# Patient Record
Sex: FEMALE | Race: WHITE
[De-identification: names, ages, dates, MRNs, and addresses within clinical notes are randomized per-mention and may not be internally consistent; named-entity substitution may affect disease eponyms.]

---

## 2017-08-09 PROBLEM — Z00.00 ENCOUNTER FOR PREVENTIVE HEALTH EXAMINATION: Status: ACTIVE | Noted: 2017-08-09

## 2017-08-14 ENCOUNTER — RECORD ABSTRACTING (OUTPATIENT)
Age: 67
End: 2017-08-14

## 2017-08-14 DIAGNOSIS — Z78.9 OTHER SPECIFIED HEALTH STATUS: ICD-10-CM

## 2017-08-14 DIAGNOSIS — Z80.9 FAMILY HISTORY OF MALIGNANT NEOPLASM, UNSPECIFIED: ICD-10-CM

## 2017-08-14 DIAGNOSIS — R20.0 ANESTHESIA OF SKIN: ICD-10-CM

## 2017-08-14 DIAGNOSIS — H53.8 OTHER VISUAL DISTURBANCES: ICD-10-CM

## 2017-08-14 DIAGNOSIS — Z86.73 PERSONAL HISTORY OF TRANSIENT ISCHEMIC ATTACK (TIA), AND CEREBRAL INFARCTION W/OUT RESIDUAL DEFICITS: ICD-10-CM

## 2017-08-28 ENCOUNTER — APPOINTMENT (OUTPATIENT)
Dept: NEUROLOGY | Facility: CLINIC | Age: 67
End: 2017-08-28

## 2017-09-12 ENCOUNTER — APPOINTMENT (OUTPATIENT)
Dept: NEUROLOGY | Facility: CLINIC | Age: 67
End: 2017-09-12
Payer: COMMERCIAL

## 2017-09-12 VITALS
DIASTOLIC BLOOD PRESSURE: 65 MMHG | WEIGHT: 142 LBS | BODY MASS INDEX: 26.13 KG/M2 | HEIGHT: 62 IN | SYSTOLIC BLOOD PRESSURE: 125 MMHG

## 2017-09-12 PROCEDURE — 99213 OFFICE O/P EST LOW 20 MIN: CPT

## 2018-03-27 ENCOUNTER — APPOINTMENT (OUTPATIENT)
Dept: NEUROLOGY | Facility: CLINIC | Age: 68
End: 2018-03-27

## 2018-05-21 ENCOUNTER — APPOINTMENT (OUTPATIENT)
Dept: NEUROLOGY | Facility: CLINIC | Age: 68
End: 2018-05-21

## 2018-06-12 ENCOUNTER — APPOINTMENT (OUTPATIENT)
Dept: NEUROLOGY | Facility: CLINIC | Age: 68
End: 2018-06-12
Payer: COMMERCIAL

## 2018-06-12 ENCOUNTER — TRANSCRIPTION ENCOUNTER (OUTPATIENT)
Age: 68
End: 2018-06-12

## 2018-06-12 VITALS
DIASTOLIC BLOOD PRESSURE: 82 MMHG | HEART RATE: 85 BPM | BODY MASS INDEX: 25.76 KG/M2 | HEIGHT: 62 IN | OXYGEN SATURATION: 98 % | WEIGHT: 140 LBS | SYSTOLIC BLOOD PRESSURE: 130 MMHG

## 2018-06-12 PROCEDURE — 99213 OFFICE O/P EST LOW 20 MIN: CPT

## 2019-04-09 ENCOUNTER — NON-APPOINTMENT (OUTPATIENT)
Age: 69
End: 2019-04-09

## 2019-04-09 ENCOUNTER — APPOINTMENT (OUTPATIENT)
Dept: CARDIOLOGY | Facility: CLINIC | Age: 69
End: 2019-04-09
Payer: COMMERCIAL

## 2019-04-09 VITALS
BODY MASS INDEX: 26.68 KG/M2 | WEIGHT: 145 LBS | DIASTOLIC BLOOD PRESSURE: 72 MMHG | RESPIRATION RATE: 16 BRPM | HEIGHT: 62 IN | HEART RATE: 92 BPM | SYSTOLIC BLOOD PRESSURE: 130 MMHG

## 2019-04-09 PROCEDURE — 93000 ELECTROCARDIOGRAM COMPLETE: CPT

## 2019-04-09 PROCEDURE — 99214 OFFICE O/P EST MOD 30 MIN: CPT

## 2019-04-09 RX ORDER — EZETIMIBE AND SIMVASTATIN 10; 20 MG/1; MG/1
10-20 TABLET ORAL
Refills: 0 | Status: DISCONTINUED | COMMUNITY
End: 2019-04-09

## 2019-04-09 RX ORDER — PREDNISONE 20 MG/1
20 TABLET ORAL
Refills: 0 | Status: DISCONTINUED | COMMUNITY
End: 2019-04-09

## 2019-04-12 NOTE — ASSESSMENT
[FreeTextEntry1] : 1.  EKG today reveals sinus rhythm at 92 bpm.  Normal intervals.  No evidence of ischemia.  \par 2.  Palpitations:  Patient with intermittent palpitations without other associated symptoms.  She has a history of chronic palpitations in the past.  I have advised her to undergo a 30-day ambulatory event monitor for further evaluation as well as echocardiogram.  She will return to see me in six weeks for reevaluation.  In the meantime, patient advised to avoid caffeinated products. \par 3.  Review of recent bloodwork performed through her PCP’s office demonstrates a total cholesterol of 175, HDL 69, TC/HDL ratio 2.5, LDL 87, triglycerides 96.  Patient advised to continue a low-fat / low-cholesterol diet.    \par

## 2019-04-12 NOTE — REASON FOR VISIT
[FreeTextEntry1] : Mrs. Daly is a pleasant 68-year-old white female with a past medical history significant for hyperlipidemia as well as palpitations who presents for evaluation.

## 2019-04-12 NOTE — HISTORY OF PRESENT ILLNESS
[FreeTextEntry1] : The patient states that approximately a month ago, she experienced a 2-day history of intermittent palpitations.  These were apparently not associated with lightheadedness or syncope.  She denies associated chest pain, shortness of breath, or diaphoresis.  She states she remains physically active and exercises three times a week.

## 2019-04-12 NOTE — PHYSICAL EXAM
[General Appearance - Well Developed] : well developed [General Appearance - Well Nourished] : well nourished [Normal Oral Mucosa] : normal oral mucosa [General Appearance - In No Acute Distress] : no acute distress [Normal Conjunctiva] : the conjunctiva exhibited no abnormalities [Auscultation Breath Sounds / Voice Sounds] : lungs were clear to auscultation bilaterally [Heart Sounds] : normal S1 and S2 [Heart Rate And Rhythm] : heart rate and rhythm were normal [Bowel Sounds] : normal bowel sounds [Skin Color & Pigmentation] : normal skin color and pigmentation [Abnormal Walk] : normal gait [Skin Turgor] : normal skin turgor [Affect] : the affect was normal [Oriented To Time, Place, And Person] : oriented to person, place, and time [Mood] : the mood was normal [FreeTextEntry1] : No edema

## 2019-04-19 ENCOUNTER — APPOINTMENT (OUTPATIENT)
Dept: CARDIOLOGY | Facility: CLINIC | Age: 69
End: 2019-04-19
Payer: COMMERCIAL

## 2019-04-19 PROCEDURE — 93306 TTE W/DOPPLER COMPLETE: CPT

## 2019-06-25 ENCOUNTER — APPOINTMENT (OUTPATIENT)
Dept: NEUROLOGY | Facility: CLINIC | Age: 69
End: 2019-06-25

## 2019-06-27 ENCOUNTER — APPOINTMENT (OUTPATIENT)
Dept: CARDIOLOGY | Facility: CLINIC | Age: 69
End: 2019-06-27
Payer: COMMERCIAL

## 2019-06-27 ENCOUNTER — NON-APPOINTMENT (OUTPATIENT)
Age: 69
End: 2019-06-27

## 2019-06-27 VITALS
WEIGHT: 143 LBS | SYSTOLIC BLOOD PRESSURE: 115 MMHG | BODY MASS INDEX: 26.31 KG/M2 | RESPIRATION RATE: 16 BRPM | HEART RATE: 89 BPM | HEIGHT: 62 IN | DIASTOLIC BLOOD PRESSURE: 70 MMHG

## 2019-06-27 PROCEDURE — 93000 ELECTROCARDIOGRAM COMPLETE: CPT

## 2019-06-27 PROCEDURE — 99213 OFFICE O/P EST LOW 20 MIN: CPT

## 2019-06-27 NOTE — REASON FOR VISIT
[FreeTextEntry1] : Mrs. Daly is a pleasant 68-year-old white female with a past medical history significant for hyperlipidemia as well as palpitations who presents for evaluation.  Also, the patient is here to review the results of her most recent 30 day event monitor and transthoracic echocardiogram.  In addition, she now states she needs cardiac clearance regarding an endoscopy procedure scheduled on July 23rd with Dr. Sheriff at [facility].  The patient reports her palpitations have since completely resolved and she feels overall well and without cardiac complaints.  She denies CP, SOB, EAST, PND, orthopnea, palpitations, presyncope, syncope.

## 2019-06-27 NOTE — PHYSICAL EXAM
[General Appearance - Well Developed] : well developed [Normal Appearance] : normal appearance [General Appearance - Well Nourished] : well nourished [Normal Conjunctiva] : the conjunctiva exhibited no abnormalities [General Appearance - In No Acute Distress] : no acute distress [Normal Jugular Venous A Waves Present] : normal jugular venous A waves present [Normal Oral Mucosa] : normal oral mucosa [No Jugular Venous [place] A Waves] : no jugular venous [place] A waves [Normal Jugular Venous V Waves Present] : normal jugular venous V waves present [Respiration, Rhythm And Depth] : normal respiratory rhythm and effort [Auscultation Breath Sounds / Voice Sounds] : lungs were clear to auscultation bilaterally [] : no respiratory distress [Heart Sounds] : normal S1 and S2 [Murmurs] : no murmurs present [Heart Rate And Rhythm] : heart rate and rhythm were normal [Edema] : no peripheral edema present [Bowel Sounds] : normal bowel sounds [Abnormal Walk] : normal gait [Skin Color & Pigmentation] : normal skin color and pigmentation [Nail Clubbing] : no clubbing of the fingernails [Oriented To Time, Place, And Person] : oriented to person, place, and time [Skin Turgor] : normal skin turgor [Affect] : the affect was normal [Impaired Insight] : insight and judgment were intact

## 2019-06-27 NOTE — ASSESSMENT
[FreeTextEntry1] : EKG 6/27/2019:  The EKG illustrates sinus rhythm, rate of 87, poor R wave progression V1 to V3, left atrial enlargement pattern.  Essentially unchanged.\par \par

## 2019-06-27 NOTE — DISCUSSION/SUMMARY
[FreeTextEntry1] : 1).  Patient reassured she is cardiovascularly stable based upon the most recent Event Monitor and Transthoracic Echocardiogram illustrating no signs of arrhythmias, pauses or blocks as well as no decreased heart function or significant valvulopathy / cardiomyopathy respectively.\par \par 2).  Based upon Mrs. Susana Daly's otherwise stable cardiac pattern, there is no absolute cardiac contraindication for her to undergo the proposed endoscopy procedure.  She may hold the aspirin five to seven days prior to the procedure and restart thereafter if you deem it safe.\par \par 3).  Follow up with our office in 6 months or PRN.\par \par If I can be of additional assistance, please do not hesitate to call.

## 2019-06-27 NOTE — HISTORY OF PRESENT ILLNESS
[FreeTextEntry1] : Patient is tolerating cardiac medications without negative side effects including ASA 81 mg QD and Vytorin 10-10 mg QD.\par \par Transthoracic Echocardiogram (4/19/2019):  Normal global left ventricular function with an EF of 50 to 55%, left and right atria are normal in size and structure, mild MR and MN.  There is no evidence of pericardial effusion.\par \par 30 Day Event Monitor (4/16 to 5/15/2019):  Baseline sinus rhythm with an average heart rate in the 80s (max 114 bpm, min 78 bpm).  There were NO events or symptoms recorded.  No signs of tachy /  iqra arrhythmias, pauses or AV blocks.

## 2019-10-16 ENCOUNTER — APPOINTMENT (OUTPATIENT)
Dept: CARDIOLOGY | Facility: CLINIC | Age: 69
End: 2019-10-16
Payer: COMMERCIAL

## 2019-10-16 VITALS
HEART RATE: 94 BPM | DIASTOLIC BLOOD PRESSURE: 78 MMHG | RESPIRATION RATE: 16 BRPM | BODY MASS INDEX: 26.87 KG/M2 | SYSTOLIC BLOOD PRESSURE: 126 MMHG | WEIGHT: 146 LBS | HEIGHT: 62 IN

## 2019-10-16 PROCEDURE — 93306 TTE W/DOPPLER COMPLETE: CPT

## 2019-10-16 PROCEDURE — 93000 ELECTROCARDIOGRAM COMPLETE: CPT

## 2019-10-16 PROCEDURE — 99214 OFFICE O/P EST MOD 30 MIN: CPT

## 2019-10-16 NOTE — REASON FOR VISIT
[FreeTextEntry1] : The patient is a 68 y.o. white female with a past medical history significant for hyperlipidemia and palpitations who presents today with a new left bundle branch block and cardiology clearance for an upcoming colonoscopy.

## 2019-10-16 NOTE — DISCUSSION/SUMMARY
[FreeTextEntry1] : Case and plan discussed with Dr. Ramirez.\par \par 1 - New LBBB:  patient went to PCP yesterday for medical clearance for an upcoming routine colonoscopy and was found to have a new LBBB.  Patient states she has been experiencing some mild exertional chest discomfort and lightheadedness for the past several weeks.  Denies shortness of breath, palpitations, or syncope.  \par \par Patient had echocardiogram 4/19/19 which revealed normal global left ventricular function and an EF of 50-55%.  Left and right atria were normal in size and structure.  No significant valvular abnormalities.\par 30-day event monitor from 4/16/19 - 5/15/19:  showed sinus rhythm with an average heart rate in the 80's (max 114, min 78).  There was no atrial fibrillation, VT, SVT, pauses or AV blocks.\par \par Will have Mrs. Daly undergo echocardiogram and 1-day pharmacologic nuclear stress test to assess for any underlying CAD.\par \par 2 - Hyperlipidemia:  Continue with Vytorin 10/20mg daily.  Follow low fat, low cholesterol, low carbohydrate diet.\par \par 3 - Await results of echocardiogram and nuclear stress test to assess for cardiac clearance.\par Patient has scheduled follow up visit with Dr. Nagy in December.

## 2019-10-16 NOTE — PHYSICAL EXAM
[General Appearance - Well Developed] : well developed [General Appearance - In No Acute Distress] : no acute distress [Normal Conjunctiva] : the conjunctiva exhibited no abnormalities [Normal Oral Mucosa] : normal oral mucosa [Auscultation Breath Sounds / Voice Sounds] : lungs were clear to auscultation bilaterally [Heart Rate And Rhythm] : heart rate and rhythm were normal [Heart Sounds] : normal S1 and S2 [Murmurs] : no murmurs present [Bowel Sounds] : normal bowel sounds [Abnormal Walk] : normal gait [FreeTextEntry1] : Trace bilateral LE edema [Skin Color & Pigmentation] : normal skin color and pigmentation [Skin Turgor] : normal skin turgor [Oriented To Time, Place, And Person] : oriented to person, place, and time [Affect] : the affect was normal [Mood] : the mood was normal

## 2019-10-16 NOTE — HISTORY OF PRESENT ILLNESS
[FreeTextEntry1] : Mrs. Daly presents today for cardiac clearance for a scheduled routine colonoscopy next week.  States she went to her PCP, Dr. Peñaloza yesterday for medical clearance and was found to have a new left bundle branch block.  States that for the past several weeks,  she has been experiencing some mild chest heaviness with exertion as well as mild lightheadedness.  Does not last very long.  Denies associated shortness of breath, palpitations or syncope.  Tries to be as compliant as she can be with her low sodium intake.

## 2019-10-17 ENCOUNTER — APPOINTMENT (OUTPATIENT)
Dept: CARDIOLOGY | Facility: CLINIC | Age: 69
End: 2019-10-17
Payer: COMMERCIAL

## 2019-10-17 PROCEDURE — A9500: CPT

## 2019-10-17 PROCEDURE — 93015 CV STRESS TEST SUPVJ I&R: CPT

## 2019-10-17 PROCEDURE — 78452 HT MUSCLE IMAGE SPECT MULT: CPT

## 2019-10-17 RX ADMIN — AMINOPHYLLINE 3 MG/ML: 25 INJECTION, SOLUTION INTRAVENOUS at 00:00

## 2019-10-17 RX ADMIN — REGADENOSON 5 MG/5ML: 0.08 INJECTION, SOLUTION INTRAVENOUS at 00:00

## 2019-10-29 RX ORDER — AMINOPHYLLINE 25 MG/ML
25 INJECTION, SOLUTION INTRAVENOUS
Qty: 0 | Refills: 0 | Status: COMPLETED | OUTPATIENT
Start: 2019-10-17

## 2019-10-29 RX ORDER — REGADENOSON 0.08 MG/ML
0.4 INJECTION, SOLUTION INTRAVENOUS
Qty: 4 | Refills: 0 | Status: COMPLETED | OUTPATIENT
Start: 2019-10-17

## 2019-11-19 RX ORDER — KIT FOR THE PREPARATION OF TECHNETIUM TC99M SESTAMIBI 1 MG/5ML
INJECTION, POWDER, LYOPHILIZED, FOR SOLUTION PARENTERAL
Refills: 0 | Status: COMPLETED | OUTPATIENT
Start: 2019-11-19

## 2019-11-19 RX ADMIN — KIT FOR THE PREPARATION OF TECHNETIUM TC99M SESTAMIBI 0: 1 INJECTION, POWDER, LYOPHILIZED, FOR SOLUTION PARENTERAL at 00:00

## 2019-12-19 ENCOUNTER — APPOINTMENT (OUTPATIENT)
Dept: CARDIOLOGY | Facility: CLINIC | Age: 69
End: 2019-12-19
Payer: COMMERCIAL

## 2019-12-19 VITALS
DIASTOLIC BLOOD PRESSURE: 70 MMHG | RESPIRATION RATE: 16 BRPM | SYSTOLIC BLOOD PRESSURE: 116 MMHG | HEIGHT: 62 IN | WEIGHT: 147 LBS | BODY MASS INDEX: 27.05 KG/M2 | HEART RATE: 84 BPM

## 2019-12-19 PROCEDURE — 99214 OFFICE O/P EST MOD 30 MIN: CPT

## 2019-12-19 PROCEDURE — 93000 ELECTROCARDIOGRAM COMPLETE: CPT

## 2019-12-24 NOTE — ASSESSMENT
[FreeTextEntry1] : 1.  EKG today reveals sinus rhythm at 84 bpm.  LVH voltage is noted.  Normal intervals.  Inverted T-waves in leads V1 through V3 which are chronic changes for this particular patient.  2.  Hyperlipidemia:  Low-fat / low-cholesterol diet advised.  3.  Left bundle branch block:  Patient with narrow complex QRS on today’s EKG.  Does appear to have intermittent LBBB.  Recent nuclear stress test utilizing IV Regadenoson failed to reveal evidence of pharmacologically induced reversible ischemia.  Patient reassured.  At this time, she has no complaints of lightheadedness or syncope.  Will continue to monitor closely.  Her echocardiogram revealed normal left ventricular chamber dimensions and wall motion without evidence of segmental wall motion abnormalities.  Left ventricular ejection fraction 50-55%.  Will continue to follow closely clinically.  If stable, office visit six months.

## 2019-12-24 NOTE — REASON FOR VISIT
[FreeTextEntry1] : Mrs. Daly is a pleasant 69-year-old white female with a past medical history significant for hyperlipidemia, palpitations, and intermittent left bundle branch block, who presents for follow up evaluation. \par \par \par

## 2019-12-24 NOTE — PHYSICAL EXAM
[General Appearance - Well Nourished] : well nourished [General Appearance - Well Developed] : well developed [Normal Conjunctiva] : the conjunctiva exhibited no abnormalities [General Appearance - In No Acute Distress] : no acute distress [Normal Oral Mucosa] : normal oral mucosa [Auscultation Breath Sounds / Voice Sounds] : lungs were clear to auscultation bilaterally [Heart Rate And Rhythm] : heart rate and rhythm were normal [Abnormal Walk] : normal gait [Bowel Sounds] : normal bowel sounds [Heart Sounds] : normal S1 and S2 [Skin Color & Pigmentation] : normal skin color and pigmentation [Skin Turgor] : normal skin turgor [Mood] : the mood was normal [Oriented To Time, Place, And Person] : oriented to person, place, and time [Affect] : the affect was normal [FreeTextEntry1] : No edema

## 2019-12-24 NOTE — HISTORY OF PRESENT ILLNESS
[FreeTextEntry1] : From a cardiac standpoint, Mrs. Daly denies exertional chest pain, shortness of breath, palpitations, lightheadedness, or syncope.

## 2020-01-17 ENCOUNTER — APPOINTMENT (OUTPATIENT)
Dept: CARDIOLOGY | Facility: CLINIC | Age: 70
End: 2020-01-17
Payer: COMMERCIAL

## 2020-01-17 PROCEDURE — 93880 EXTRACRANIAL BILAT STUDY: CPT

## 2020-02-28 ENCOUNTER — APPOINTMENT (OUTPATIENT)
Dept: CARDIOLOGY | Facility: CLINIC | Age: 70
End: 2020-02-28

## 2020-06-03 ENCOUNTER — APPOINTMENT (OUTPATIENT)
Dept: CARDIOLOGY | Facility: CLINIC | Age: 70
End: 2020-06-03
Payer: COMMERCIAL

## 2020-06-03 VITALS
HEIGHT: 62 IN | BODY MASS INDEX: 26.68 KG/M2 | HEART RATE: 95 BPM | DIASTOLIC BLOOD PRESSURE: 76 MMHG | SYSTOLIC BLOOD PRESSURE: 144 MMHG | WEIGHT: 145 LBS

## 2020-06-03 DIAGNOSIS — Z86.39 PERSONAL HISTORY OF OTHER ENDOCRINE, NUTRITIONAL AND METABOLIC DISEASE: ICD-10-CM

## 2020-06-03 PROCEDURE — 99213 OFFICE O/P EST LOW 20 MIN: CPT | Mod: 95

## 2020-06-03 NOTE — PHYSICAL EXAM
[General Appearance - Well Developed] : well developed [General Appearance - Well Nourished] : well nourished [] : no respiratory distress [General Appearance - In No Acute Distress] : no acute distress [Normal Conjunctiva] : the conjunctiva exhibited no abnormalities [Skin Color & Pigmentation] : normal skin color and pigmentation [Oriented To Time, Place, And Person] : oriented to person, place, and time [Affect] : the affect was normal [Mood] : the mood was normal

## 2020-06-04 NOTE — HISTORY OF PRESENT ILLNESS
[Home] : at home, [unfilled] , at the time of the visit. [Medical Office: ([facility])___] : at the medical office located in  [Spouse] : spouse [FreeTextEntry1] : Patient requested a TELEHEALTH contact at this time to discuss specific cardiovascular issues and associated risk factors. This contact took place via TELEHEALTH link utilizing [employer] software. Consent was obtained from the patient in advance of this communication. The consent form can be found in the "OTHER CONSENTS" section of the patient's medical record. Time spent on this communication was approximately: 15 minutes\par \par From a cardiac standpoint, Mrs. Daly denies exertional chest pain, shortness of breath, palpitations, lightheadedness, or syncope.  She remains reasonably active given the current COVID-19 restrictions.

## 2020-06-04 NOTE — ASSESSMENT
[FreeTextEntry1] :   Hyperlipidemia:  Patient attempting to follow a low-fat / low-cholesterol diet.  Has not undergoing fasting bloodwork.  Will do so prior to her next office visit which will likely be in 2-3 months.   Palpitations:  Patient without significant palpitations at this time.  Has been advised to avoid caffeinated products and walk as much as possible.   Intermittent left bundle branch block:  Unable to assess cardiac conduction system at this time due to the inability to obtain EKG.  Patient without symptoms of lightheadedness or syncope.    Peripheral artery disease:  Patient status-post carotid duplex five months ago which revealed mild bilateral plaquing.  Currently on Vytorin and low-dose aspirin.  Will undergo fasting bloodwork prior to her next office visit.

## 2020-09-18 ENCOUNTER — APPOINTMENT (OUTPATIENT)
Dept: CARDIOLOGY | Facility: CLINIC | Age: 70
End: 2020-09-18
Payer: COMMERCIAL

## 2020-09-18 VITALS
WEIGHT: 148 LBS | RESPIRATION RATE: 16 BRPM | SYSTOLIC BLOOD PRESSURE: 112 MMHG | HEIGHT: 62 IN | BODY MASS INDEX: 27.23 KG/M2 | HEART RATE: 87 BPM | DIASTOLIC BLOOD PRESSURE: 70 MMHG | TEMPERATURE: 97.2 F

## 2020-09-18 PROCEDURE — 93000 ELECTROCARDIOGRAM COMPLETE: CPT

## 2020-09-18 PROCEDURE — 99214 OFFICE O/P EST MOD 30 MIN: CPT

## 2020-09-21 NOTE — REASON FOR VISIT
[FreeTextEntry1] : Mrs. Daly is a pleasant 69-year-old white female with a past medical history significant for hyperlipidemia, palpitations, and intermittent left bundle branch block on EKG, who presents for follow up evaluation.  \par  \par

## 2020-09-21 NOTE — HISTORY OF PRESENT ILLNESS
[FreeTextEntry1] :  From a cardiac standpoint, Mrs. Daly feels well denying exertional chest pain, shortness of breath, palpitations, lightheadedness, or syncope.  She is currently observing COVID-19 pandemic restrictions.

## 2020-09-21 NOTE — ASSESSMENT
[FreeTextEntry1] : \par 1.  EKG today reveals normal sinus rhythm at 87 bpm.  Left bundle branch block.  \par \par 2.  Hyperlipidemia:  Review of recent lipid profile demonstrates a total cholesterol of 187, HDL 80, TC/HDL ratio 2.3, LDL 87, triglycerides 103.  Patient advised to continue a low-fat / low-cholesterol diet as well as current Vytorin therapy.  \par \par 3.  Palpitations:  Patient without significant palpitations in recent months.  \par \par 4.  Peripheral artery disease:  Patient with carotid duplex earlier this year which demonstrated mild bilateral plaquing.  Patient advised to continue with statin therapy as well as low-dose aspirin.  She is also advised to exercise on a regular basis and if clinically stable to return here in approximately six months for a follow up evaluation.   \par

## 2021-03-09 ENCOUNTER — APPOINTMENT (OUTPATIENT)
Dept: CARDIOLOGY | Facility: CLINIC | Age: 71
End: 2021-03-09
Payer: COMMERCIAL

## 2021-03-09 VITALS
TEMPERATURE: 97.5 F | RESPIRATION RATE: 16 BRPM | WEIGHT: 148 LBS | DIASTOLIC BLOOD PRESSURE: 66 MMHG | HEIGHT: 62 IN | SYSTOLIC BLOOD PRESSURE: 120 MMHG | HEART RATE: 92 BPM | BODY MASS INDEX: 27.23 KG/M2

## 2021-03-09 DIAGNOSIS — R51.9 HEADACHE, UNSPECIFIED: ICD-10-CM

## 2021-03-09 DIAGNOSIS — M62.838 OTHER MUSCLE SPASM: ICD-10-CM

## 2021-03-09 PROCEDURE — 99214 OFFICE O/P EST MOD 30 MIN: CPT

## 2021-03-09 PROCEDURE — 93000 ELECTROCARDIOGRAM COMPLETE: CPT

## 2021-03-09 PROCEDURE — 99072 ADDL SUPL MATRL&STAF TM PHE: CPT

## 2021-03-09 RX ORDER — PANTOPRAZOLE 40 MG/1
40 TABLET, DELAYED RELEASE ORAL DAILY
Refills: 0 | Status: ACTIVE | COMMUNITY

## 2021-03-09 RX ORDER — EZETIMIBE AND SIMVASTATIN 10; 20 MG/1; MG/1
10-20 TABLET ORAL
Refills: 0 | Status: ACTIVE | COMMUNITY

## 2021-03-09 RX ORDER — ASPIRIN ENTERIC COATED TABLETS 81 MG 81 MG/1
81 TABLET, DELAYED RELEASE ORAL
Refills: 0 | Status: ACTIVE | COMMUNITY

## 2021-03-09 RX ORDER — CRANBERRY FRUIT EXTRACT 200 MG
CAPSULE ORAL
Refills: 0 | Status: ACTIVE | COMMUNITY

## 2021-03-09 RX ORDER — NITROFURANTOIN (MONOHYDRATE/MACROCRYSTALS) 25; 75 MG/1; MG/1
100 CAPSULE ORAL DAILY
Refills: 0 | Status: ACTIVE | COMMUNITY

## 2021-03-09 RX ORDER — MULTIVITAMIN
TABLET ORAL
Refills: 0 | Status: ACTIVE | COMMUNITY

## 2021-03-09 RX ORDER — MULTIVIT-MIN/IRON/FOLIC ACID/K 18-600-40
CAPSULE ORAL
Refills: 0 | Status: ACTIVE | COMMUNITY

## 2021-03-09 RX ORDER — BACILLUS COAGULANS/INULIN 1B-250 MG
CAPSULE ORAL
Refills: 0 | Status: ACTIVE | COMMUNITY

## 2021-03-11 NOTE — PHYSICAL EXAM
[General Appearance - Well Developed] : well developed [General Appearance - Well Nourished] : well nourished [General Appearance - In No Acute Distress] : no acute distress [Normal Conjunctiva] : the conjunctiva exhibited no abnormalities [Normal Oral Mucosa] : normal oral mucosa [] : no respiratory distress [Heart Rate And Rhythm] : heart rate and rhythm were normal [Heart Sounds] : normal S1 and S2 [Bowel Sounds] : normal bowel sounds [Abnormal Walk] : normal gait [Skin Color & Pigmentation] : normal skin color and pigmentation [Oriented To Time, Place, And Person] : oriented to person, place, and time [Affect] : the affect was normal [Mood] : the mood was normal [FreeTextEntry1] : No edema

## 2021-03-11 NOTE — ASSESSMENT
[FreeTextEntry1] : 1.  EKG today reveals sinus rhythm at 92 bpm.  Left bundle branch block. \par \par 2.  Palpitations:  Patient without significant palpitations at this time.\par \par 3.  Hyperlipidemia:  No recent bloodwork performed.  Patient advised to follow up with her PCP and undergo fasting lipid profile. \par \par 4.  Neck pain associated with left shoulder and arm pain:  Patient with a history of cervical disc disease.  Suspect bulging disc with nerve impingement as the cause.  I have advised the patient to follow up with her PCP regarding MRI assessment of her cervical spine as well as antianxiety medication in order to better deal with her emotional ups and downs.  If stable from a cardiac standpoint, office visit six months.  \par

## 2021-03-11 NOTE — REASON FOR VISIT
[FreeTextEntry1] : Mrs. Daly is a pleasant 65-year-old white female with a past medical history significant for hyperlipidemia, palpitations, intermittent left bundle branch block on EKG, and cervical disc disease, who presents for follow up evaluation.  \par

## 2021-03-11 NOTE — HISTORY OF PRESENT ILLNESS
[FreeTextEntry1] : From a cardiac standpoint, Mrs. Daly denies exertional chest pain, shortness of breath, or palpitations.  She experiences intermittent neck pain with radiation to left shoulder and down her left arm.  These episodes appear to occur in conjunction with increased emotional stress.

## 2021-05-19 ENCOUNTER — APPOINTMENT (OUTPATIENT)
Dept: CARDIOLOGY | Facility: CLINIC | Age: 71
End: 2021-05-19
Payer: COMMERCIAL

## 2021-05-19 VITALS
HEART RATE: 87 BPM | SYSTOLIC BLOOD PRESSURE: 114 MMHG | DIASTOLIC BLOOD PRESSURE: 64 MMHG | TEMPERATURE: 97.9 F | HEIGHT: 62 IN | BODY MASS INDEX: 26.68 KG/M2 | RESPIRATION RATE: 16 BRPM | WEIGHT: 145 LBS

## 2021-05-19 PROCEDURE — 93000 ELECTROCARDIOGRAM COMPLETE: CPT

## 2021-05-19 PROCEDURE — 99214 OFFICE O/P EST MOD 30 MIN: CPT

## 2021-05-19 PROCEDURE — 99072 ADDL SUPL MATRL&STAF TM PHE: CPT

## 2021-05-21 NOTE — REASON FOR VISIT
[FreeTextEntry1] : Mrs. Daly is a pleasant 70-year-old white female with a past medical history significant for hyperlipidemia, palpitations, intermittent left bundle branch block, and cervical disease who presents for follow up evaluation.  \par \par

## 2021-05-21 NOTE — ASSESSMENT
[FreeTextEntry1] : 1.  EKG today reveals sinus rhythm at 87 bpm.  Left bundle branch block.  \par \par 2.  “Labile hypertension:” Patient with normal blood pressure here today as well as most other times she has visited here.  I do not believe that she has labile hypertension but rather occasional blood pressure spikes secondary to back pain.  I have advised her on a strict low-salt diet, good hydration, and exercise.  Would not continue recently prescribed Valsartan 40 mg q.d. \par \par 3.  Hyperlipidemia:  Patient without recent bloodwork.  Patient states that she will follow up with her PCP who will do bloodwork in July.  I will see the patient at the end of August for follow up.  A low-fat / low-cholesterol / low-salt diet discussed.  \par

## 2021-05-21 NOTE — HISTORY OF PRESENT ILLNESS
[FreeTextEntry1] : From a cardiac standpoint, Mrs. Daly denies exertional chest pain, shortness of breath, palpitations, lightheadedness, or syncope.  She does continue to experience intermittent neck and back pain and states that from time to time her blood pressure is “elevated.”

## 2021-08-09 NOTE — PHYSICAL EXAM
[General Appearance - Well Developed] : well developed [General Appearance - Well Nourished] : well nourished [General Appearance - In No Acute Distress] : no acute distress [Normal Conjunctiva] : the conjunctiva exhibited no abnormalities [Normal Oral Mucosa] : normal oral mucosa [] : no respiratory distress [Heart Rate And Rhythm] : heart rate and rhythm were normal [Heart Sounds] : normal S1 and S2 [Bowel Sounds] : normal bowel sounds [Abnormal Walk] : normal gait [Skin Color & Pigmentation] : normal skin color and pigmentation [Oriented To Time, Place, And Person] : oriented to person, place, and time Statement Selected [Affect] : the affect was normal [Mood] : the mood was normal [FreeTextEntry1] : No edema

## 2021-08-30 ENCOUNTER — RESULT CHARGE (OUTPATIENT)
Age: 71
End: 2021-08-30

## 2021-08-31 ENCOUNTER — APPOINTMENT (OUTPATIENT)
Dept: CARDIOLOGY | Facility: CLINIC | Age: 71
End: 2021-08-31
Payer: COMMERCIAL

## 2021-08-31 VITALS
HEART RATE: 79 BPM | BODY MASS INDEX: 26.87 KG/M2 | HEIGHT: 62 IN | WEIGHT: 146 LBS | SYSTOLIC BLOOD PRESSURE: 120 MMHG | DIASTOLIC BLOOD PRESSURE: 60 MMHG | RESPIRATION RATE: 16 BRPM

## 2021-08-31 PROCEDURE — 99214 OFFICE O/P EST MOD 30 MIN: CPT

## 2021-08-31 PROCEDURE — 93000 ELECTROCARDIOGRAM COMPLETE: CPT

## 2021-09-02 NOTE — ASSESSMENT
[FreeTextEntry1] : 1.  EKG today reveals normal sinus rhythm at 79 bpm.  Normal intervals.  LVH voltage.  Chronic T-wave inversions are noted in precordial leads V1 through V5.  \par \par 2.  Hyperlipidemia:  Review of recent bloodwork demonstrates a total cholesterol of 173, HDL 72, TC/HDL ratio 2.4, LDL 82, triglycerides 97.  Patient is advised to continue a low-fat / low-cholesterol diet as well as her Vytorin therapy.  \par \par 3.  Palpitations:  These appear to be very sporadic but rare at this point.  Patient to notify me if these worsen in any way.  \par \par 4.  Intermittent left bundle branch block:  Patient in sinus rhythm without left bundle branch block today.  Noted to have chronic precordial T-wave changes.  Patient advised to exercise on a regular basis, and if clinically stable, will undergo a carotid duplex to assess her peripheral artery disease prior to her follow up in six months.   \par

## 2021-09-02 NOTE — REASON FOR VISIT
[FreeTextEntry1] : Mrs. Daly is a pleasant 70-year-old white female with a past medical history significant for hyperlipidemia, palpitations, intermittent left bundle branch block, and cervical disc disease, who presents for follow up evaluation.  \par

## 2021-09-17 ENCOUNTER — EMERGENCY (EMERGENCY)
Facility: HOSPITAL | Age: 71
LOS: 0 days | Discharge: HOME | End: 2021-09-17
Attending: STUDENT IN AN ORGANIZED HEALTH CARE EDUCATION/TRAINING PROGRAM
Payer: COMMERCIAL

## 2021-09-17 VITALS
RESPIRATION RATE: 16 BRPM | SYSTOLIC BLOOD PRESSURE: 153 MMHG | OXYGEN SATURATION: 99 % | DIASTOLIC BLOOD PRESSURE: 78 MMHG | HEART RATE: 97 BPM | TEMPERATURE: 99 F

## 2021-09-17 VITALS — HEIGHT: 64 IN | WEIGHT: 145.06 LBS

## 2021-09-17 DIAGNOSIS — R11.0 NAUSEA: ICD-10-CM

## 2021-09-17 DIAGNOSIS — G43.809 OTHER MIGRAINE, NOT INTRACTABLE, WITHOUT STATUS MIGRAINOSUS: ICD-10-CM

## 2021-09-17 DIAGNOSIS — R42 DIZZINESS AND GIDDINESS: ICD-10-CM

## 2021-09-17 DIAGNOSIS — H81.10 BENIGN PAROXYSMAL VERTIGO, UNSPECIFIED EAR: ICD-10-CM

## 2021-09-17 DIAGNOSIS — R51.9 HEADACHE, UNSPECIFIED: ICD-10-CM

## 2021-09-17 LAB
ALBUMIN SERPL ELPH-MCNC: 4.2 G/DL — SIGNIFICANT CHANGE UP (ref 3.3–5)
ALP SERPL-CCNC: 79 U/L — SIGNIFICANT CHANGE UP (ref 40–120)
ALT FLD-CCNC: 32 U/L — SIGNIFICANT CHANGE UP (ref 12–78)
ANION GAP SERPL CALC-SCNC: 5 MMOL/L — SIGNIFICANT CHANGE UP (ref 5–17)
AST SERPL-CCNC: 21 U/L — SIGNIFICANT CHANGE UP (ref 15–37)
BILIRUB SERPL-MCNC: 0.4 MG/DL — SIGNIFICANT CHANGE UP (ref 0.2–1.2)
BUN SERPL-MCNC: 19 MG/DL — SIGNIFICANT CHANGE UP (ref 7–23)
CALCIUM SERPL-MCNC: 9 MG/DL — SIGNIFICANT CHANGE UP (ref 8.5–10.1)
CHLORIDE SERPL-SCNC: 105 MMOL/L — SIGNIFICANT CHANGE UP (ref 96–108)
CO2 SERPL-SCNC: 27 MMOL/L — SIGNIFICANT CHANGE UP (ref 22–31)
CREAT SERPL-MCNC: 0.7 MG/DL — SIGNIFICANT CHANGE UP (ref 0.5–1.3)
GLUCOSE SERPL-MCNC: 109 MG/DL — HIGH (ref 70–99)
HCT VFR BLD CALC: 40.9 % — SIGNIFICANT CHANGE UP (ref 34.5–45)
HGB BLD-MCNC: 13.4 G/DL — SIGNIFICANT CHANGE UP (ref 11.5–15.5)
MCHC RBC-ENTMCNC: 30.3 PG — SIGNIFICANT CHANGE UP (ref 27–34)
MCHC RBC-ENTMCNC: 32.8 GM/DL — SIGNIFICANT CHANGE UP (ref 32–36)
MCV RBC AUTO: 92.5 FL — SIGNIFICANT CHANGE UP (ref 80–100)
PLATELET # BLD AUTO: 334 K/UL — SIGNIFICANT CHANGE UP (ref 150–400)
POTASSIUM SERPL-MCNC: 3.8 MMOL/L — SIGNIFICANT CHANGE UP (ref 3.5–5.3)
POTASSIUM SERPL-SCNC: 3.8 MMOL/L — SIGNIFICANT CHANGE UP (ref 3.5–5.3)
PROT SERPL-MCNC: 7.9 GM/DL — SIGNIFICANT CHANGE UP (ref 6–8.3)
RBC # BLD: 4.42 M/UL — SIGNIFICANT CHANGE UP (ref 3.8–5.2)
RBC # FLD: 12.8 % — SIGNIFICANT CHANGE UP (ref 10.3–14.5)
SODIUM SERPL-SCNC: 137 MMOL/L — SIGNIFICANT CHANGE UP (ref 135–145)
WBC # BLD: 8.46 K/UL — SIGNIFICANT CHANGE UP (ref 3.8–10.5)
WBC # FLD AUTO: 8.46 K/UL — SIGNIFICANT CHANGE UP (ref 3.8–10.5)

## 2021-09-17 PROCEDURE — 99284 EMERGENCY DEPT VISIT MOD MDM: CPT

## 2021-09-17 PROCEDURE — 96374 THER/PROPH/DIAG INJ IV PUSH: CPT

## 2021-09-17 PROCEDURE — 80053 COMPREHEN METABOLIC PANEL: CPT

## 2021-09-17 PROCEDURE — 96375 TX/PRO/DX INJ NEW DRUG ADDON: CPT

## 2021-09-17 PROCEDURE — 85027 COMPLETE CBC AUTOMATED: CPT

## 2021-09-17 PROCEDURE — 99284 EMERGENCY DEPT VISIT MOD MDM: CPT | Mod: 25

## 2021-09-17 PROCEDURE — 36415 COLL VENOUS BLD VENIPUNCTURE: CPT

## 2021-09-17 RX ORDER — MECLIZINE HCL 12.5 MG
1 TABLET ORAL
Qty: 20 | Refills: 0
Start: 2021-09-17

## 2021-09-17 RX ORDER — KETOROLAC TROMETHAMINE 30 MG/ML
15 SYRINGE (ML) INJECTION ONCE
Refills: 0 | Status: DISCONTINUED | OUTPATIENT
Start: 2021-09-17 | End: 2021-09-17

## 2021-09-17 RX ORDER — SODIUM CHLORIDE 9 MG/ML
1000 INJECTION INTRAMUSCULAR; INTRAVENOUS; SUBCUTANEOUS ONCE
Refills: 0 | Status: COMPLETED | OUTPATIENT
Start: 2021-09-17 | End: 2021-09-17

## 2021-09-17 RX ORDER — ONDANSETRON 8 MG/1
1 TABLET, FILM COATED ORAL
Qty: 20 | Refills: 0
Start: 2021-09-17

## 2021-09-17 RX ORDER — METOCLOPRAMIDE HCL 10 MG
10 TABLET ORAL ONCE
Refills: 0 | Status: COMPLETED | OUTPATIENT
Start: 2021-09-17 | End: 2021-09-17

## 2021-09-17 RX ADMIN — Medication 10 MILLIGRAM(S): at 20:27

## 2021-09-17 RX ADMIN — SODIUM CHLORIDE 1000 MILLILITER(S): 9 INJECTION INTRAMUSCULAR; INTRAVENOUS; SUBCUTANEOUS at 20:27

## 2021-09-17 RX ADMIN — Medication 15 MILLIGRAM(S): at 20:27

## 2021-09-17 NOTE — ED STATDOCS - CLINICAL SUMMARY MEDICAL DECISION MAKING FREE TEXT BOX
Known Hx of vestibular migraines with vertigo. symptoms similar to prior. Dizziness improved. Still headache some headache. Feels mildly dehydrated. symptoms control and discharge. No cone for new acute pathology. Known Hx of vestibular migraines with vertigo. symptoms similar to prior. Dizziness improved. Still headache some headache. Feels mildly dehydrated. symptoms control and discharge. No cone for new acute pathology.    KAVEH Jarvis, Attending: reviewed note.    Resolved peripheral vertigo from known vestibular migraines. Plan to treat lingering headache and hydrate. No concern for acute CNS or cardiac issue. Has specialist f/u.

## 2021-09-17 NOTE — ED STATDOCS - OBJECTIVE STATEMENT
71 y/o female presents to the ED c/o headache, dizziness with associated nausea. Pt states she has a diagnosis of vestibular migraines and prescribed with Meclizine and Zofran.  States she had room room-spinning dizziness today. States she was seen at [facility] and came to ED for further evaluation. No longer has the room-spinning feeling now, but is asking for IVF, because she was unable to eat/drink much today, secondary to the dizziness and nausea. Has a neurologist to follow-up with: Dr. Rocky Rodriguez. 71 y/o female presents to the ED c/o headache, dizziness with associated nausea. Pt states she has a diagnosis of vestibular migraines and prescribed with Meclizine and Zofran.  States she had room room-spinning dizziness today. States she was seen at [facility] and came to ED for further evaluation. No longer has the room-spinning feeling now, but is asking for IVF, because she was unable to eat/drink much today, secondary to the dizziness and nausea. Has a neurologist to follow-up with: Dr. Rocky Rodriguez.    KAVEH Jarvis, Attending: chest pain, dyspnea, or fever/chills. No falls or trauma. Feeling improved. Planning to see neurologist.

## 2021-09-17 NOTE — ED STATDOCS - ATTENDING CONTRIBUTION TO CARE
I, Ge Jarvis, ED Attending,  performed the initial face to face bedside interview with this patient regarding history of present illness, review of symptoms and relevant past medical, social and family history.  I completed an independent physical examination.  I was the initial provider who evaluated this patient. I have signed out the follow up of any pending tests (i.e. labs, radiological studies) to the ACP.  I have communicated the patient’s plan of care and disposition with the ACP.  The history, relevant review of systems, past medical and surgical history, medical decision making, and physical examination was documented by the scribe in my presence and I attest to the accuracy of the documentation.

## 2021-09-17 NOTE — ED ADULT TRIAGE NOTE - CHIEF COMPLAINT QUOTE
Pt has headache, dizziness and nausea since Wednesday. Reported that she feels like the room is spinning.   Hx migraine. Meds meclizine, zofran

## 2021-09-17 NOTE — ED STATDOCS - PROGRESS NOTE DETAILS
Patient now feeling well, labs unremarkable. Will perform PO trial and dc home. - Nadir Mcclure PA-C 71 y/o F with PMH of migraines presents with dizziness, HA, nausea w/out vomiting. pt reported feeling sensation of room spinning today. Seen at outside urgent care, advised to go to ED. Was provided meclizine and zofran, now reports improvement in symptoms. Of note, states she didn't have much to eat or drink today as well due to persistent nausea and dizziness. Neurologist: Dr. Rodriguez. PE: Well appearing. HEENT: PERRLA, EOMI. No midline C-spine tenderness. Cardiac: s1s2, RRR. lungs: CTAB. Neuro: CN II-xii intact. Sensation intact to light touch in all extremities. 5/5 strength in all extremities. A/P: BPPV: plan for symptomatic care, labs, IVF, reassess. - Nadir Mcclure PA-C

## 2021-09-17 NOTE — ED STATDOCS - PATIENT PORTAL LINK FT
You can access the FollowMyHealth Patient Portal offered by [facility] by registering at the following website: [email or web address]. By joining [employer]’s FollowMyHealth portal, you will also be able to view your health information using other applications (apps) compatible with our system.

## 2021-09-17 NOTE — ED STATDOCS - CARE PLAN
1745-1cc of air removed from TR band. 1800-3cc of air removed from TR band. 1815-3cc of air removed from TR band. 1830-3cc of air removed from TR band. 1845-3cc of air removed from TR band. TR band now completely deflated. 1915-TR band removed and sterile occlusive dressing applied over site. No s/s of bleeding or hematoma. Principal Discharge DX:	BPPV (benign paroxysmal positional vertigo)  Secondary Diagnosis:	Nausea   1

## 2021-09-17 NOTE — ED STATDOCS - EYES, MLM
DAVID MARINELLI    Patient Age: 69 year old   Refill request by: Phone.  Caller informed to check with the pharmacy later for their refill.  If problems arise, we will contact patient.  Refill to be: Phoned to [id number] - [place], [place] - [address]    Medication requested to be refilled:   pantoprazole (PROTONIX) 40 MG tablet  metoPROLOL tartrate (LOPRESSOR) 25 MG tablet    David needs the refill of Metoprolol today as he only has one pill left today.    Message confirmed with caller       WEIGHT AND HEIGHT:   Wt Readings from Last 1 Encounters:   08/06/19 129.8 kg (286 lb 3.2 oz)     Ht Readings from Last 1 Encounters:   08/06/19 6' 2\" (1.88 m)     BMI Readings from Last 1 Encounters:   08/06/19 36.75 kg/m²       ALLERGIES:  Morphine  Current Outpatient Medications   Medication   • pantoprazole (PROTONIX) 40 MG tablet   • nystatin-triamcinolone (MYCOLOG II) 017370-6.1 UNIT/GM-% ointment   • metoPROLOL tartrate (LOPRESSOR) 25 MG tablet   • traMADol (ULTRAM) 50 MG tablet     No current facility-administered medications for this visit.      PHARMACY to use: [facility][id number] - [place], [place] - [address] & [address] [address]  Phone: [phone] Fax: [phone]           Pharmacy preference(s) on file:   [facility] #[id number] - [place] - [address]  [facility] [address]  Phone: [phone] Fax: [phone]      CALL BACK INFO: Ok to leave response (including medical information) on answering machine  ROUTING: Patient's physician/staff        PCP: Roman Dreyer, MD         INS: Payor: MEDICARE / Plan: PARTA AND B / Product Type: MEDICARE   PATIENT ADDRESS:  [address] [place] [id number]     clear bilaterally.  Pupils equal, round, and reactive to light.

## 2021-09-17 NOTE — ED ADULT NURSE NOTE - OBJECTIVE STATEMENT
Pt has headache, dizziness and nausea since Wednesday. Reported that she feels like the room is spinning.

## 2022-01-10 ENCOUNTER — APPOINTMENT (OUTPATIENT)
Dept: CARDIOLOGY | Facility: CLINIC | Age: 72
End: 2022-01-10
Payer: COMMERCIAL

## 2022-01-10 PROCEDURE — 93880 EXTRACRANIAL BILAT STUDY: CPT

## 2022-02-01 ENCOUNTER — RESULT CHARGE (OUTPATIENT)
Age: 72
End: 2022-02-01

## 2022-02-02 ENCOUNTER — APPOINTMENT (OUTPATIENT)
Dept: CARDIOLOGY | Facility: CLINIC | Age: 72
End: 2022-02-02
Payer: COMMERCIAL

## 2022-02-02 VITALS
WEIGHT: 148 LBS | HEART RATE: 85 BPM | BODY MASS INDEX: 27.23 KG/M2 | HEIGHT: 62 IN | DIASTOLIC BLOOD PRESSURE: 66 MMHG | SYSTOLIC BLOOD PRESSURE: 124 MMHG | RESPIRATION RATE: 16 BRPM

## 2022-02-02 DIAGNOSIS — G43.809 OTHER MIGRAINE, NOT INTRACTABLE, W/OUT STATUS MIGRAINOSUS: ICD-10-CM

## 2022-02-02 PROCEDURE — 99214 OFFICE O/P EST MOD 30 MIN: CPT

## 2022-02-02 PROCEDURE — 93000 ELECTROCARDIOGRAM COMPLETE: CPT

## 2022-02-03 NOTE — ASSESSMENT
[FreeTextEntry1] : 1.  EKG today reveals sinus rhythm at 85 bpm.  Left bundle branch block.  \par \par 2.  Hyperlipidemia:  No recent bloodwork available for review.  Patient is advised on a low-fat / low-cholesterol diet.  \par \par 3.  Peripheral artery disease:  Patient with recent carotid Doppler evaluation which revealed mild plaquing involving the left internal carotid artery.  Patient advised to continue low-dose aspirin as well as statin therapy.  A low-fat / low-cholesterol diet was discussed as well.  Patient will undergo fasting bloodwork in the near future.  If clinically stable, office visit six months. \par

## 2022-02-03 NOTE — HISTORY OF PRESENT ILLNESS
[FreeTextEntry1] : From a cardiac standpoint, Mrs. Daly feels well denying exertional chest pain, shortness of breath, or palpitations.  She experienced an episode of dizziness and lightheadedness associated with considerable vertigo several months ago.  She was diagnosed with vestibular migraine.  This improved with Meclizine.  \par \par

## 2022-02-03 NOTE — REASON FOR VISIT
[FreeTextEntry1] : Mrs. Daly is a pleasant 71-year-old white female with a past medical history significant for hyperlipidemia, palpitations, intermittent left bundle branch block on EKG, and cervical disc disease, who presents for follow up evaluation.  \par

## 2022-07-13 ENCOUNTER — APPOINTMENT (OUTPATIENT)
Dept: CARDIOLOGY | Facility: CLINIC | Age: 72
End: 2022-07-13

## 2022-07-13 VITALS
DIASTOLIC BLOOD PRESSURE: 72 MMHG | RESPIRATION RATE: 16 BRPM | HEIGHT: 62 IN | BODY MASS INDEX: 26.31 KG/M2 | HEART RATE: 87 BPM | SYSTOLIC BLOOD PRESSURE: 120 MMHG | WEIGHT: 143 LBS

## 2022-07-13 DIAGNOSIS — I44.7 LEFT BUNDLE-BRANCH BLOCK, UNSPECIFIED: ICD-10-CM

## 2022-07-13 PROCEDURE — 93000 ELECTROCARDIOGRAM COMPLETE: CPT

## 2022-07-13 PROCEDURE — 99214 OFFICE O/P EST MOD 30 MIN: CPT | Mod: 25

## 2022-07-14 NOTE — ASSESSMENT
[FreeTextEntry1] : 1.  EKG today demonstrates normal sinus rhythm at 87 bpm.  Left bundle branch block. \par \par 2.  Hyperlipidemia:  Review of recent bloodwork reveals a total cholesterol of 177, HDL 75, triglycerides 76.  There is no LDL value in these results, however, the calculation puts LDL in the mid to high 80s.  Patient advised to continue current medications and follow a low-fat / low-cholesterol diet.  \par \par 3.  Left bundle branch block:  Clinically stable without complaints of lightheadedness or syncope.  Will continue to monitor clinically.  If stable, office visit six months.  \par   \par

## 2022-07-14 NOTE — REASON FOR VISIT
[FreeTextEntry1] : Mrs. Daly is a pleasant 71-year-old white female with a past medical history significant for hyperlipidemia, palpitations, left bundle branch block on EKG, and cervical disc disease, who presents for follow up evaluation.  \par

## 2022-07-14 NOTE — HISTORY OF PRESENT ILLNESS
[FreeTextEntry1] : From a cardiac standpoint, Mrs. Daly denies exertional chest pain, shortness of breath, or other cardiac symptoms.  She states that recently contracted COVID-19 infection while visiting [place].  She recovered uneventfully.\par \par

## 2023-01-26 ENCOUNTER — APPOINTMENT (OUTPATIENT)
Dept: CARDIOLOGY | Facility: CLINIC | Age: 73
End: 2023-01-26
Payer: COMMERCIAL

## 2023-01-26 ENCOUNTER — NON-APPOINTMENT (OUTPATIENT)
Age: 73
End: 2023-01-26

## 2023-01-26 VITALS
BODY MASS INDEX: 26.87 KG/M2 | SYSTOLIC BLOOD PRESSURE: 120 MMHG | WEIGHT: 146 LBS | HEIGHT: 62 IN | DIASTOLIC BLOOD PRESSURE: 70 MMHG | RESPIRATION RATE: 16 BRPM | HEART RATE: 81 BPM

## 2023-01-26 DIAGNOSIS — R00.2 PALPITATIONS: ICD-10-CM

## 2023-01-26 DIAGNOSIS — I65.29 OCCLUSION AND STENOSIS OF UNSPECIFIED CAROTID ARTERY: ICD-10-CM

## 2023-01-26 PROCEDURE — 99214 OFFICE O/P EST MOD 30 MIN: CPT | Mod: 25

## 2023-01-26 PROCEDURE — 93000 ELECTROCARDIOGRAM COMPLETE: CPT

## 2023-01-26 NOTE — REASON FOR VISIT
[FreeTextEntry1] : Mrs. Daly is a pleasant 72-year-old white female with a past medical history significant for hyperlipidemia, palpitations, left bundle branch block on EKG, and cervical disc disease, who presents for follow up evaluation.

## 2023-01-26 NOTE — ASSESSMENT
[FreeTextEntry1] : 1. EKG today demonstrates normal sinus rhythm at 81 bpm. Left bundle branch block with left axis.  Essentially unchanged. \par \par 2. Hyperlipidemia: Review of recent bloodwork reveals a total cholesterol of 177, HDL 75, triglycerides 76. There is no LDL value in these results, however, the calculation puts LDL in the mid to high 80s. Patient advised to continue current medications and follow a low-fat / low-cholesterol diet. \par \par Follow up with PCP (Dr. Peñaloza) regarding routine checkups and fasting blood work including lipid panel.  Forward all testing/lab work to our office. \par \par 3. Left bundle branch block: Clinically stable without complaints of lightheadedness or syncope. Will continue to monitor clinically. Recommend she complete a Transthoracic Echocardiogram prior to follow up to assess overall cardiac function and valvulopathy.  Follow up with Dr. Nagy in 6 months or PRN.\par \par 4). Carotid Artery Stenosis:  Recommend  she complete a Carotid Doppler study prior to follow up to assess extent of carotid plaquing stenosis.  Continue with aspirin and Vytorin as directed.

## 2023-01-26 NOTE — HISTORY OF PRESENT ILLNESS
[FreeTextEntry1] : From a cardiac standpoint, Mrs. Daly denies exertional chest pain, shortness of breath, or other cardiac symptoms. She states she recently came back from visiting [place] and exercising periodically without complication;

## 2023-05-05 ENCOUNTER — NON-APPOINTMENT (OUTPATIENT)
Age: 73
End: 2023-05-05

## 2023-05-12 NOTE — ED ADULT NURSE NOTE - NS_NURSE_DISC_TEACHING_YN_ED_ALL_ED
Progress Note      Reason for Referral:  Hortencia Garcia (MR#: [id number]) was referred to this psychologist by PCP to address issues related to mood.     Service Provided:  I met with Hortencia today for a 58 minute individual psychotherapy appointment.     This visit was performed via live interactive two-way video conferencing platform utilizing [employer] HIPAA compliant Zoom integrated software.  Patient verbalized understanding of the use of this modality and provided informed consent.  Clinician Location: [place] [facility] [place]  Patient Location: [place] address listed in EHR. Patient confirms that she is in a safe and confidential place. Patient confirmed that no one else is in the room except her and that no one is recording the session.     Relevant History and Session Note:  Hortencia presented today reporting that mood is stable. 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 Patient processed some aspects of her working environment (difficult coworkers, negativity, etc.) and impact on her mood and perspective. Provided psychoeducation on strategies to stay present and maintain an optimistic mindset.     Interventions:   Psychologist took person-centered approach, engaged in empathetic listening and promoting positive self-regard as it relates to patient's presenting problem of mood.  Explored her current stressors/presenting problems.  Psychological flexibility  Motivational interviewing  Self care behaviors -structure and routine on a daily basis; leisure, connecting with nature,vacations     Additional Therapeutic Support Provided:  Engaged/allied with patient  Provided support and encouragement  Provided empathy and active listening  Provided feedback and recommendations     MENTAL STATUS EXAM:  Progress relevant to last session: same  Hortencia presented today as:   Behavior:Cooperative, Polite and Within Normal Limits  Appearance and Hygiene: within normal limits  Eye  Contact:appropriate  Speech: logical, coherent and appropriate rate rhythm and volume  Attention:  adequate  Gait, movement and Motor Coordination: appropriate  Alert and Oriented: Yes, to Person, Place, and Time  Mood/Affect: pleasant  Thought Process: logical and coherent   Cognitive Performance:  normal limits  Insight and Judgment: within normal limits for age   Self-Harm: denied  Current Suicidal Ideation: Suicidal ideation, plan, or intent reported? denied  Homicidal Ideation: Homicidal ideation, plan, or intent reported?  denied     Diagnosis:  Major depressive disorder, recurrent, mild  Generalized anxiety disorder     Recommendations/Plan:  I will continue to follow Hortencia regularly to provide training in development of better coping strategies in relation to her issues of mood.    Follow up in 2-3 week(s).  Hortencia will go to the nearest emergency room or call 9-1-1 if she ever reports feelings of suicidality or if they believe she may be a threat to self or others.  Hortencia will inform Dr. Mitchell or other support if she feels unsafe.    Current Outpatient Medications   Medication Sig Dispense Refill   • Multiple Vitamins-Minerals (MULTIVITAMIN ADULT PO) Take by mouth daily.     • azelastine (ASTELIN) 0.1 % nasal spray Spray 2 sprays in each nostril.     • cetirizine (ZYRTEC) 10 MG tablet Take 10 mg by mouth daily as needed for Allergies.     • diclofenac (VOLTAREN) 1 % gel APPLY TO THE AFFECTED AREA(S) FOUR TIMES DAILY.  0   • famotidine (PEPCID) 20 MG tablet Take 20 mg by mouth.     • fluticasone (FLONASE) 50 MCG/ACT nasal spray Spray 2 sprays in each nostril daily.     • lidocaine (LIDODERM) 5 % patch Place 1 patch onto the skin daily as needed for Pain.     • montelukast (SINGULAIR) 10 MG tablet TAKE 1 TABLET BY ORAL ROUTE EVERY EVENING.  1   • PAZEO 0.7 % ophthalmic solution daily as needed.     • albuterol 108 (90 Base) MCG/ACT inhaler        No current facility-administered medications for this visit.        Thank you for the opportunity to participate in the care of Hortencia. Please feel free to contact me at [phone] should you have any questions about my recommendations.      Maryuri Mitchell PSYD           Yes

## 2023-05-30 ENCOUNTER — OUTPATIENT (OUTPATIENT)
Dept: OUTPATIENT SERVICES | Facility: HOSPITAL | Age: 73
LOS: 1 days | End: 2023-05-30
Payer: COMMERCIAL

## 2023-05-30 DIAGNOSIS — Z98.890 OTHER SPECIFIED POSTPROCEDURAL STATES: Chronic | ICD-10-CM

## 2023-05-30 DIAGNOSIS — Z90.49 ACQUIRED ABSENCE OF OTHER SPECIFIED PARTS OF DIGESTIVE TRACT: Chronic | ICD-10-CM

## 2023-05-30 DIAGNOSIS — Z90.710 ACQUIRED ABSENCE OF BOTH CERVIX AND UTERUS: Chronic | ICD-10-CM

## 2023-05-30 DIAGNOSIS — Z01.818 ENCOUNTER FOR OTHER PREPROCEDURAL EXAMINATION: ICD-10-CM

## 2023-05-30 DIAGNOSIS — K82.8 OTHER SPECIFIED DISEASES OF GALLBLADDER: ICD-10-CM

## 2023-05-30 LAB
ABO RH CONFIRMATION: SIGNIFICANT CHANGE UP
ANION GAP SERPL CALC-SCNC: 5 MMOL/L — SIGNIFICANT CHANGE UP (ref 5–17)
BASOPHILS # BLD AUTO: 0.04 K/UL — SIGNIFICANT CHANGE UP (ref 0–0.2)
BASOPHILS NFR BLD AUTO: 0.8 % — SIGNIFICANT CHANGE UP (ref 0–2)
BLD GP AB SCN SERPL QL: SIGNIFICANT CHANGE UP
BUN SERPL-MCNC: 21 MG/DL — SIGNIFICANT CHANGE UP (ref 7–23)
CALCIUM SERPL-MCNC: 9.3 MG/DL — SIGNIFICANT CHANGE UP (ref 8.5–10.1)
CHLORIDE SERPL-SCNC: 108 MMOL/L — SIGNIFICANT CHANGE UP (ref 96–108)
CO2 SERPL-SCNC: 28 MMOL/L — SIGNIFICANT CHANGE UP (ref 22–31)
CREAT SERPL-MCNC: 0.72 MG/DL — SIGNIFICANT CHANGE UP (ref 0.5–1.3)
EGFR: 90 ML/MIN/1.73M2 — SIGNIFICANT CHANGE UP
EOSINOPHIL # BLD AUTO: 0.06 K/UL — SIGNIFICANT CHANGE UP (ref 0–0.5)
EOSINOPHIL NFR BLD AUTO: 1.2 % — SIGNIFICANT CHANGE UP (ref 0–6)
GLUCOSE SERPL-MCNC: 97 MG/DL — SIGNIFICANT CHANGE UP (ref 70–99)
HCT VFR BLD CALC: 39 % — SIGNIFICANT CHANGE UP (ref 34.5–45)
HGB BLD-MCNC: 13 G/DL — SIGNIFICANT CHANGE UP (ref 11.5–15.5)
IMM GRANULOCYTES NFR BLD AUTO: 0.2 % — SIGNIFICANT CHANGE UP (ref 0–0.9)
LYMPHOCYTES # BLD AUTO: 2.45 K/UL — SIGNIFICANT CHANGE UP (ref 1–3.3)
LYMPHOCYTES # BLD AUTO: 47.7 % — HIGH (ref 13–44)
MCHC RBC-ENTMCNC: 30.9 PG — SIGNIFICANT CHANGE UP (ref 27–34)
MCHC RBC-ENTMCNC: 33.3 GM/DL — SIGNIFICANT CHANGE UP (ref 32–36)
MCV RBC AUTO: 92.6 FL — SIGNIFICANT CHANGE UP (ref 80–100)
MONOCYTES # BLD AUTO: 0.57 K/UL — SIGNIFICANT CHANGE UP (ref 0–0.9)
MONOCYTES NFR BLD AUTO: 11.1 % — SIGNIFICANT CHANGE UP (ref 2–14)
NEUTROPHILS # BLD AUTO: 2.01 K/UL — SIGNIFICANT CHANGE UP (ref 1.8–7.4)
NEUTROPHILS NFR BLD AUTO: 39 % — LOW (ref 43–77)
PLATELET # BLD AUTO: 281 K/UL — SIGNIFICANT CHANGE UP (ref 150–400)
POTASSIUM SERPL-MCNC: 4.3 MMOL/L — SIGNIFICANT CHANGE UP (ref 3.5–5.3)
POTASSIUM SERPL-SCNC: 4.3 MMOL/L — SIGNIFICANT CHANGE UP (ref 3.5–5.3)
RBC # BLD: 4.21 M/UL — SIGNIFICANT CHANGE UP (ref 3.8–5.2)
RBC # FLD: 13 % — SIGNIFICANT CHANGE UP (ref 10.3–14.5)
SODIUM SERPL-SCNC: 141 MMOL/L — SIGNIFICANT CHANGE UP (ref 135–145)
WBC # BLD: 5.14 K/UL — SIGNIFICANT CHANGE UP (ref 3.8–10.5)
WBC # FLD AUTO: 5.14 K/UL — SIGNIFICANT CHANGE UP (ref 3.8–10.5)

## 2023-05-30 PROCEDURE — 80048 BASIC METABOLIC PNL TOTAL CA: CPT

## 2023-05-30 PROCEDURE — 86850 RBC ANTIBODY SCREEN: CPT

## 2023-05-30 PROCEDURE — 85025 COMPLETE CBC W/AUTO DIFF WBC: CPT

## 2023-05-30 PROCEDURE — 86901 BLOOD TYPING SEROLOGIC RH(D): CPT

## 2023-05-30 PROCEDURE — 86900 BLOOD TYPING SEROLOGIC ABO: CPT

## 2023-05-30 PROCEDURE — 93005 ELECTROCARDIOGRAM TRACING: CPT

## 2023-05-30 PROCEDURE — 93010 ELECTROCARDIOGRAM REPORT: CPT

## 2023-05-30 PROCEDURE — 36415 COLL VENOUS BLD VENIPUNCTURE: CPT

## 2023-05-30 RX ORDER — UBROGEPANT 100 MG/1
1 TABLET ORAL
Refills: 0 | DISCHARGE

## 2023-05-30 RX ORDER — EZETIMIBE 10 MG/1
1 TABLET ORAL
Refills: 0 | DISCHARGE

## 2023-05-30 RX ORDER — ASPIRIN/CALCIUM CARB/MAGNESIUM 324 MG
0 TABLET ORAL
Refills: 0 | DISCHARGE

## 2023-05-30 RX ORDER — LANSOPRAZOLE 15 MG/1
1 CAPSULE, DELAYED RELEASE ORAL
Refills: 0 | DISCHARGE

## 2023-05-30 RX ORDER — CETIRIZINE HYDROCHLORIDE 10 MG/1
1 TABLET ORAL
Refills: 0 | DISCHARGE

## 2023-05-30 RX ORDER — ALPRAZOLAM 0.25 MG
1 TABLET ORAL
Refills: 0 | DISCHARGE

## 2023-05-30 RX ORDER — OLOPATADINE HYDROCHLORIDE 1 MG/ML
1 SOLUTION/ DROPS OPHTHALMIC
Refills: 0 | DISCHARGE

## 2023-05-30 RX ORDER — NITROFURANTOIN MACROCRYSTAL 50 MG
1 CAPSULE ORAL
Refills: 0 | DISCHARGE

## 2023-05-30 NOTE — ASU PATIENT PROFILE, ADULT - NSICDXPASTMEDICALHX_GEN_ALL_CORE_FT
PAST MEDICAL HISTORY:  Early cataract     Frequent UTI     Gallstones     GERD (gastroesophageal reflux disease)     Hyperlipidemia     LBBB (left bundle branch block)     Seasonal allergies     Vertigo     Vestibular migraine      PAST MEDICAL HISTORY:  Anxiety     Early cataract     Frequent UTI     Gallstones     GERD (gastroesophageal reflux disease)     Hyperlipidemia     LBBB (left bundle branch block)     Seasonal allergies     Vertigo     Vestibular migraine

## 2023-05-30 NOTE — ASU PATIENT PROFILE, ADULT - NSICDXPASTSURGICALHX_GEN_ALL_CORE_FT
PAST SURGICAL HISTORY:  H/O colonoscopy     History of appendectomy     History of esophagogastroduodenoscopy (EGD)     History of hysterectomy     History of removal of cyst

## 2023-05-30 NOTE — CHART NOTE - NSCHARTNOTEFT_GEN_A_CORE
5/30/2023--72 y.o female scheduled for a Laparoscopic Cholecystectomy  VS: BP- 139/78  P-80  T- 97.2  SpO2- 985  Plan  1. Stop all NSAIDS, herbal supplements and vitamins for 7 days.  2. NPO at midnight.  3. Take the following medications--none--with small sips of water on the morning of your procedure/surgery.  4. Use EZ sponges as directed  5. Labs, EKG as per surgeon  6. PMD KEKE Peñaloza visit for optimization prior to surgery as per surgeon  7. Preprocedure education provided

## 2023-05-30 NOTE — ASU PATIENT PROFILE, ADULT - FALL HARM RISK - UNIVERSAL INTERVENTIONS
Bed in lowest position, wheels locked, appropriate side rails in place/Call bell, personal items and telephone in reach/Instruct patient to call for assistance before getting out of bed or chair/Non-slip footwear when patient is out of bed/[place] to call system/Physically safe environment - no spills, clutter or unnecessary equipment/Purposeful Proactive Rounding/Room/bathroom lighting operational, light cord in reach

## 2023-05-31 DIAGNOSIS — K82.8 OTHER SPECIFIED DISEASES OF GALLBLADDER: ICD-10-CM

## 2023-05-31 DIAGNOSIS — Z01.818 ENCOUNTER FOR OTHER PREPROCEDURAL EXAMINATION: ICD-10-CM

## 2023-06-01 PROBLEM — K21.9 GASTRO-ESOPHAGEAL REFLUX DISEASE WITHOUT ESOPHAGITIS: Chronic | Status: ACTIVE | Noted: 2023-05-30

## 2023-06-01 PROBLEM — R42 DIZZINESS AND GIDDINESS: Chronic | Status: ACTIVE | Noted: 2023-05-30

## 2023-06-01 PROBLEM — J30.2 OTHER SEASONAL ALLERGIC RHINITIS: Chronic | Status: ACTIVE | Noted: 2023-05-30

## 2023-06-01 PROBLEM — I44.7 LEFT BUNDLE-BRANCH BLOCK, UNSPECIFIED: Chronic | Status: ACTIVE | Noted: 2023-05-30

## 2023-06-01 PROBLEM — K80.20 CALCULUS OF GALLBLADDER WITHOUT CHOLECYSTITIS WITHOUT OBSTRUCTION: Chronic | Status: ACTIVE | Noted: 2023-05-30

## 2023-06-01 PROBLEM — N39.0 URINARY TRACT INFECTION, SITE NOT SPECIFIED: Chronic | Status: ACTIVE | Noted: 2023-05-30

## 2023-06-01 PROBLEM — E78.5 HYPERLIPIDEMIA, UNSPECIFIED: Chronic | Status: ACTIVE | Noted: 2023-05-30

## 2023-06-01 PROBLEM — F41.9 ANXIETY DISORDER, UNSPECIFIED: Chronic | Status: ACTIVE | Noted: 2023-05-30

## 2023-06-01 PROBLEM — H26.9 UNSPECIFIED CATARACT: Chronic | Status: ACTIVE | Noted: 2023-05-30

## 2023-06-02 ENCOUNTER — APPOINTMENT (OUTPATIENT)
Dept: CARDIOLOGY | Facility: CLINIC | Age: 73
End: 2023-06-02
Payer: COMMERCIAL

## 2023-06-02 ENCOUNTER — NON-APPOINTMENT (OUTPATIENT)
Age: 73
End: 2023-06-02

## 2023-06-02 VITALS
RESPIRATION RATE: 16 BRPM | HEIGHT: 62 IN | DIASTOLIC BLOOD PRESSURE: 72 MMHG | BODY MASS INDEX: 26.31 KG/M2 | WEIGHT: 143 LBS | HEART RATE: 78 BPM | SYSTOLIC BLOOD PRESSURE: 120 MMHG

## 2023-06-02 DIAGNOSIS — R07.89 OTHER CHEST PAIN: ICD-10-CM

## 2023-06-02 DIAGNOSIS — R06.09 OTHER FORMS OF DYSPNEA: ICD-10-CM

## 2023-06-02 DIAGNOSIS — R53.82 CHRONIC FATIGUE, UNSPECIFIED: ICD-10-CM

## 2023-06-02 DIAGNOSIS — I42.9 CARDIOMYOPATHY, UNSPECIFIED: ICD-10-CM

## 2023-06-02 DIAGNOSIS — I44.7 LEFT BUNDLE-BRANCH BLOCK, UNSPECIFIED: ICD-10-CM

## 2023-06-02 DIAGNOSIS — I38 ENDOCARDITIS, VALVE UNSPECIFIED: ICD-10-CM

## 2023-06-02 DIAGNOSIS — Z01.810 ENCOUNTER FOR PREPROCEDURAL CARDIOVASCULAR EXAMINATION: ICD-10-CM

## 2023-06-02 DIAGNOSIS — E78.00 PURE HYPERCHOLESTEROLEMIA, UNSPECIFIED: ICD-10-CM

## 2023-06-02 PROCEDURE — 93306 TTE W/DOPPLER COMPLETE: CPT

## 2023-06-02 PROCEDURE — 93000 ELECTROCARDIOGRAM COMPLETE: CPT

## 2023-06-02 PROCEDURE — 99215 OFFICE O/P EST HI 40 MIN: CPT | Mod: 25

## 2023-06-02 RX ORDER — SACUBITRIL AND VALSARTAN 24; 26 MG/1; MG/1
24-26 TABLET, FILM COATED ORAL
Qty: 180 | Refills: 1 | Status: ACTIVE | COMMUNITY
Start: 2023-06-02 | End: 1900-01-01

## 2023-06-02 NOTE — HISTORY OF PRESENT ILLNESS
[FreeTextEntry1] : From a cardiac standpoint, Mrs. Daly now admits to getting some exertional chest pain and shortness of breath symptoms from time to time.  She's also been getting much more fatigued lately as well.  Patient is back today to complete an updated Transthoracic Echocardiogram in anticipation for undergoing a cholecystectomy procedure tentatively scheduled for June 6th with Dr. Lincoln Nava at [facility]; \par \par Unfortunately, her echocardiogram from today (06/02/2023) is now demonstrating severely decreased LV systolic function; LVEF of 27%. There is also abnormal septal motion consistent with LBBB.  There are multiple segmental abnormalities including basal anteroseptal and mid-anteroseptal wall dyskinesis, anterior and posterior wall hypokinesis, and inferior wall akinesis.  The left and right atria were normal in size and structure.  Trace MR and TR.  There was no pericardial effusion noted; \par \par After further review, it appears patient was found to have new onset LBBB during a nuclear stress test back in (2019), but during that time, her myocardial perfusion images were reported "normal" with preserved LV systolic function; LVEF of 63%.  Patient had remained asymptomatic and there was no need for intervention at that time, and routine cardiac testing has been performed; \par \par After further questioning, it appears Mrs. Daly has been experiencing some epigastric/mid-sternal discomfort for a few weeks now, which sometimes radiates around to her right lateral axilla.  There has also been associated exertional dyspnea experienced lately with increased fatigue; \par \par She also reports getting COVID-19 back in April 2022 with minor "cold symptoms".  She received 3 COVID vaccinations prior to this infection.\par \par

## 2023-06-02 NOTE — ASSESSMENT
[FreeTextEntry1] : 1. EKG today demonstrates normal sinus rhythm at 78 bpm. Left bundle branch block with left axis.\par \par 2. Hyperlipidemia: Review of recent bloodwork reveals a total cholesterol of 177, HDL 75, triglycerides 76. There is no LDL value in these results, however, the calculation puts LDL in the mid to high 80s. Patient advised to continue current medications and follow a low-fat / low-cholesterol diet. \par \par Follow up with PCP (Dr. Peñaloza) regarding routine checkups and fasting blood work including lipid panel. Forward all testing/lab work to our office. \par \par 3. Left bundle branch block: Patient now c/o progressively worsening fatigue, dyspnea on exertion, and epigastric/midsternal discomfort.  Today's echocardiogram demonstrates severe LV systolic dysfunction with LVEF of 27% and multiple segmental abnormalities including basal anteroseptal and mid-anteroseptal wall dyskinesis, anterior and posterior wall hypokinesis, and inferior wall akinesis.\par \par Considering these concerning symptoms that have progressively worsened, along with most recent echocardiogram now showing severe LV dysfunction with significant wall motion abnormalities, and having recently developed LBBB pattern with somewhat equivocal nuclear stress test, will recommend she undergo a left heart catheterization in near future to further delineate coronary perfusion to rule out ischemia.\par \par Upcoming cholecystectomy surgery will need to be postponed until further notice pending results from cardiac catheterization. \par \par Started patient on low dose Entresto 24-26 mg BID in hopes of improving LV function (given samples for 1 month).\par \par Continue all other cardiac medications daily including aspirin 81 mg and Vytorin 10-20 mg.\par \par 4). Immediately go to the emergency department and/or report any untoward symptoms to our office. \par \par 5). Follow up with our office shortly after completing cardiac catheterization or PRN.  Will discuss any intervention that may have been needed, and discuss rescheduling her cholecystectomy surgery at follow up.  If no significant CAD found, consider possible viral induced cardiomyopathy?

## 2023-06-02 NOTE — PHYSICAL EXAM
[Well Developed] : well developed [Well Nourished] : well nourished [No Acute Distress] : no acute distress [Normal Conjunctiva] : normal conjunctiva [Normal Venous Pressure] : normal venous pressure [No Carotid Bruit] : no carotid bruit [Normal S1, S2] : normal S1, S2 [No Rub] : no rub [No Gallop] : no gallop [Clear Lung Fields] : clear lung fields [Good Air Entry] : good air entry [No Respiratory Distress] : no respiratory distress  [Soft] : abdomen soft [Non Tender] : non-tender [No Masses/organomegaly] : no masses/organomegaly [Normal Gait] : normal gait [No Edema] : no edema [No Cyanosis] : no cyanosis [No Clubbing] : no clubbing [No Rash] : no rash [No Skin Lesions] : no skin lesions [Moves all extremities] : moves all extremities [No Focal Deficits] : no focal deficits [Normal Speech] : normal speech [Alert and Oriented] : alert and oriented [Normal memory] : normal memory [Appears Anxious] : appears anxious

## 2023-06-26 ENCOUNTER — APPOINTMENT (OUTPATIENT)
Dept: NEUROLOGY | Facility: CLINIC | Age: 73
End: 2023-06-26

## 2023-06-27 ENCOUNTER — OFFICE (OUTPATIENT)
Dept: URBAN - METROPOLITAN AREA CLINIC 6 | Facility: CLINIC | Age: 73
Setting detail: OPHTHALMOLOGY
End: 2023-06-27
Payer: COMMERCIAL

## 2023-06-27 DIAGNOSIS — H01.002: ICD-10-CM

## 2023-06-27 DIAGNOSIS — H16.223: ICD-10-CM

## 2023-06-27 DIAGNOSIS — H01.004: ICD-10-CM

## 2023-06-27 DIAGNOSIS — H01.001: ICD-10-CM

## 2023-06-27 DIAGNOSIS — H01.005: ICD-10-CM

## 2023-06-27 DIAGNOSIS — H25.13: ICD-10-CM

## 2023-06-27 DIAGNOSIS — H43.813: ICD-10-CM

## 2023-06-27 PROCEDURE — 92201 OPSCPY EXTND RTA DRAW UNI/BI: CPT | Performed by: OPHTHALMOLOGY

## 2023-06-27 PROCEDURE — 92004 COMPRE OPH EXAM NEW PT 1/>: CPT | Performed by: OPHTHALMOLOGY

## 2023-06-27 ASSESSMENT — REFRACTION_CURRENTRX
OD_SPHERE: -1.75
OD_VPRISM_DIRECTION: PROGS
OS_OVR_VA: 20/
OD_AXIS: 40
OS_AXIS: 172
OS_ADD: +2.50
OD_OVR_VA: 20/
OS_VPRISM_DIRECTION: PROGS
OD_ADD: +2.50
OD_CYLINDER: -0.50
OS_SPHERE: -2.00
OS_CYLINDER: -0.50

## 2023-06-27 ASSESSMENT — KERATOMETRY
OS_K2POWER_DIOPTERS: 46.00
OD_AXISANGLE_DEGREES: 97
OS_K1POWER_DIOPTERS: 45.00
METHOD_AUTO_MANUAL: AUTO
OD_K2POWER_DIOPTERS: 47.25
OD_K1POWER_DIOPTERS: 45.25
OS_AXISANGLE_DEGREES: 99

## 2023-06-27 ASSESSMENT — DECREASING TEAR LAKE - SEVERITY SCORE
OD_DEC_TEARLAKE: 1+
OS_DEC_TEARLAKE: 1+

## 2023-06-27 ASSESSMENT — VISUAL ACUITY
OS_BCVA: 20/30-
OD_BCVA: 20/30-

## 2023-06-27 ASSESSMENT — CONFRONTATIONAL VISUAL FIELD TEST (CVF)
OS_FINDINGS: FULL
OD_FINDINGS: FULL

## 2023-06-27 ASSESSMENT — AXIALLENGTH_DERIVED
OS_AL: 23.8338
OS_AL: 23.8338

## 2023-06-27 ASSESSMENT — REFRACTION_AUTOREFRACTION
OS_SPHERE: -2.25
OS_CYLINDER: -0.50
OS_AXIS: 39

## 2023-06-27 ASSESSMENT — REFRACTION_MANIFEST
OS_CYLINDER: -0.50
OS_VA1: 20/30-
OS_SPHERE: -2.25
OS_AXIS: 39

## 2023-06-27 ASSESSMENT — SPHEQUIV_DERIVED
OS_SPHEQUIV: -2.5
OS_SPHEQUIV: -2.5

## 2023-06-27 ASSESSMENT — LID EXAM ASSESSMENTS
OS_BLEPHARITIS: LLL LUL
OD_BLEPHARITIS: RLL RUL

## 2023-06-27 ASSESSMENT — TONOMETRY
OD_IOP_MMHG: 20
OS_IOP_MMHG: 20

## 2023-07-11 ENCOUNTER — APPOINTMENT (OUTPATIENT)
Dept: CARDIOLOGY | Facility: CLINIC | Age: 73
End: 2023-07-11

## 2023-07-25 ENCOUNTER — APPOINTMENT (OUTPATIENT)
Dept: CARDIOLOGY | Facility: CLINIC | Age: 73
End: 2023-07-25

## 2023-09-06 ENCOUNTER — RX ONLY (RX ONLY)
Age: 73
End: 2023-09-06

## 2023-09-06 ENCOUNTER — OFFICE (OUTPATIENT)
Dept: URBAN - METROPOLITAN AREA CLINIC 6 | Facility: CLINIC | Age: 73
Setting detail: OPHTHALMOLOGY
End: 2023-09-06
Payer: COMMERCIAL

## 2023-09-06 DIAGNOSIS — H25.13: ICD-10-CM

## 2023-09-06 DIAGNOSIS — H16.223: ICD-10-CM

## 2023-09-06 DIAGNOSIS — H01.004: ICD-10-CM

## 2023-09-06 DIAGNOSIS — H01.001: ICD-10-CM

## 2023-09-06 DIAGNOSIS — H01.005: ICD-10-CM

## 2023-09-06 DIAGNOSIS — H01.002: ICD-10-CM

## 2023-09-06 PROCEDURE — 68761 CLOSE TEAR DUCT OPENING: CPT | Performed by: OPHTHALMOLOGY

## 2023-09-06 PROCEDURE — 99213 OFFICE O/P EST LOW 20 MIN: CPT | Performed by: OPHTHALMOLOGY

## 2023-09-06 ASSESSMENT — KERATOMETRY
OS_AXISANGLE_DEGREES: 111
OS_K1POWER_DIOPTERS: 45.25
OS_K2POWER_DIOPTERS: 46.00
OD_K1POWER_DIOPTERS: 44.75
OD_K2POWER_DIOPTERS: 45.75
METHOD_AUTO_MANUAL: AUTO
OD_AXISANGLE_DEGREES: 92

## 2023-09-06 ASSESSMENT — LID EXAM ASSESSMENTS
OD_BLEPHARITIS: RLL RUL
OS_BLEPHARITIS: LLL LUL

## 2023-09-06 ASSESSMENT — AXIALLENGTH_DERIVED
OS_AL: 23.8864
OS_AL: 23.787
OD_AL: 23.68

## 2023-09-06 ASSESSMENT — REFRACTION_AUTOREFRACTION
OS_SPHERE: -2.50
OS_AXIS: 36
OD_AXIS: 15
OS_CYLINDER: -0.50
OD_CYLINDER: -0.75
OD_SPHERE: -1.50

## 2023-09-06 ASSESSMENT — REFRACTION_MANIFEST
OS_SPHERE: -2.25
OS_AXIS: 39
OS_VA1: 20/30-
OS_CYLINDER: -0.50

## 2023-09-06 ASSESSMENT — VISUAL ACUITY
OS_BCVA: 20/20-
OD_BCVA: 20/20

## 2023-09-06 ASSESSMENT — REFRACTION_CURRENTRX
OS_ADD: +2.50
OS_OVR_VA: 20/
OS_CYLINDER: -0.50
OD_ADD: +2.50
OS_SPHERE: -2.00
OD_SPHERE: -1.75
OD_VPRISM_DIRECTION: PROGS
OD_OVR_VA: 20/
OD_CYLINDER: -0.50
OS_AXIS: 172
OS_VPRISM_DIRECTION: PROGS
OD_AXIS: 40

## 2023-09-06 ASSESSMENT — TONOMETRY
OD_IOP_MMHG: 14
OS_IOP_MMHG: 16

## 2023-09-06 ASSESSMENT — SPHEQUIV_DERIVED
OS_SPHEQUIV: -2.5
OS_SPHEQUIV: -2.75
OD_SPHEQUIV: -1.875

## 2023-09-06 ASSESSMENT — DECREASING TEAR LAKE - SEVERITY SCORE
OD_DEC_TEARLAKE: 1+
OS_DEC_TEARLAKE: 1+

## 2023-09-06 ASSESSMENT — CONFRONTATIONAL VISUAL FIELD TEST (CVF)
OD_FINDINGS: FULL
OS_FINDINGS: FULL

## 2023-09-06 ASSESSMENT — PUNCTA - ASSESSMENT
OD_PUNCTA: SIL PLUG RLL
OS_PUNCTA: SIL PLUG LLL

## 2024-05-02 ENCOUNTER — EMERGENCY (EMERGENCY)
Facility: HOSPITAL | Age: 74
LOS: 0 days | Discharge: HOME | End: 2024-05-02
Attending: STUDENT IN AN ORGANIZED HEALTH CARE EDUCATION/TRAINING PROGRAM
Payer: COMMERCIAL

## 2024-05-02 VITALS
OXYGEN SATURATION: 100 % | DIASTOLIC BLOOD PRESSURE: 109 MMHG | WEIGHT: 139.99 LBS | TEMPERATURE: 98 F | HEIGHT: 62 IN | HEART RATE: 83 BPM | RESPIRATION RATE: 16 BRPM | SYSTOLIC BLOOD PRESSURE: 131 MMHG

## 2024-05-02 VITALS
TEMPERATURE: 98 F | RESPIRATION RATE: 16 BRPM | SYSTOLIC BLOOD PRESSURE: 119 MMHG | OXYGEN SATURATION: 99 % | DIASTOLIC BLOOD PRESSURE: 57 MMHG | HEART RATE: 86 BPM

## 2024-05-02 DIAGNOSIS — R42 DIZZINESS AND GIDDINESS: ICD-10-CM

## 2024-05-02 DIAGNOSIS — Z79.82 LONG TERM (CURRENT) USE OF ASPIRIN: ICD-10-CM

## 2024-05-02 DIAGNOSIS — Z98.890 OTHER SPECIFIED POSTPROCEDURAL STATES: Chronic | ICD-10-CM

## 2024-05-02 DIAGNOSIS — R11.2 NAUSEA WITH VOMITING, UNSPECIFIED: ICD-10-CM

## 2024-05-02 DIAGNOSIS — Z90.49 ACQUIRED ABSENCE OF OTHER SPECIFIED PARTS OF DIGESTIVE TRACT: Chronic | ICD-10-CM

## 2024-05-02 DIAGNOSIS — Z86.69 PERSONAL HISTORY OF OTHER DISEASES OF THE NERVOUS SYSTEM AND SENSE ORGANS: ICD-10-CM

## 2024-05-02 DIAGNOSIS — K21.9 GASTRO-ESOPHAGEAL REFLUX DISEASE WITHOUT ESOPHAGITIS: ICD-10-CM

## 2024-05-02 DIAGNOSIS — E78.5 HYPERLIPIDEMIA, UNSPECIFIED: ICD-10-CM

## 2024-05-02 DIAGNOSIS — Z95.0 PRESENCE OF CARDIAC PACEMAKER: ICD-10-CM

## 2024-05-02 DIAGNOSIS — Z90.710 ACQUIRED ABSENCE OF BOTH CERVIX AND UTERUS: Chronic | ICD-10-CM

## 2024-05-02 LAB
ALBUMIN SERPL ELPH-MCNC: 4.3 G/DL — SIGNIFICANT CHANGE UP (ref 3.3–5)
ALP SERPL-CCNC: 91 U/L — SIGNIFICANT CHANGE UP (ref 40–120)
ALT FLD-CCNC: 29 U/L — SIGNIFICANT CHANGE UP (ref 12–78)
ANION GAP SERPL CALC-SCNC: 8 MMOL/L — SIGNIFICANT CHANGE UP (ref 5–17)
APPEARANCE UR: CLEAR — SIGNIFICANT CHANGE UP
AST SERPL-CCNC: 28 U/L — SIGNIFICANT CHANGE UP (ref 15–37)
BACTERIA # UR AUTO: NEGATIVE /HPF — SIGNIFICANT CHANGE UP
BASOPHILS # BLD AUTO: 0.04 K/UL — SIGNIFICANT CHANGE UP (ref 0–0.2)
BASOPHILS NFR BLD AUTO: 0.3 % — SIGNIFICANT CHANGE UP (ref 0–2)
BILIRUB SERPL-MCNC: 0.5 MG/DL — SIGNIFICANT CHANGE UP (ref 0.2–1.2)
BILIRUB UR-MCNC: NEGATIVE — SIGNIFICANT CHANGE UP
BUN SERPL-MCNC: 17 MG/DL — SIGNIFICANT CHANGE UP (ref 7–23)
CALCIUM SERPL-MCNC: 9.2 MG/DL — SIGNIFICANT CHANGE UP (ref 8.5–10.1)
CAST: 0 /LPF — SIGNIFICANT CHANGE UP (ref 0–4)
CHLORIDE SERPL-SCNC: 105 MMOL/L — SIGNIFICANT CHANGE UP (ref 96–108)
CO2 SERPL-SCNC: 27 MMOL/L — SIGNIFICANT CHANGE UP (ref 22–31)
COLOR SPEC: YELLOW — SIGNIFICANT CHANGE UP
CREAT SERPL-MCNC: 0.8 MG/DL — SIGNIFICANT CHANGE UP (ref 0.5–1.3)
DIFF PNL FLD: NEGATIVE — SIGNIFICANT CHANGE UP
EGFR: 79 ML/MIN/1.73M2 — SIGNIFICANT CHANGE UP
EOSINOPHIL # BLD AUTO: 0.01 K/UL — SIGNIFICANT CHANGE UP (ref 0–0.5)
EOSINOPHIL NFR BLD AUTO: 0.1 % — SIGNIFICANT CHANGE UP (ref 0–6)
GLUCOSE SERPL-MCNC: 130 MG/DL — HIGH (ref 70–99)
GLUCOSE UR QL: NEGATIVE MG/DL — SIGNIFICANT CHANGE UP
HCT VFR BLD CALC: 41.7 % — SIGNIFICANT CHANGE UP (ref 34.5–45)
HGB BLD-MCNC: 13.5 G/DL — SIGNIFICANT CHANGE UP (ref 11.5–15.5)
IMM GRANULOCYTES NFR BLD AUTO: 0.3 % — SIGNIFICANT CHANGE UP (ref 0–0.9)
KETONES UR-MCNC: 15 MG/DL
LEUKOCYTE ESTERASE UR-ACNC: ABNORMAL
LIDOCAIN IGE QN: 22 U/L — SIGNIFICANT CHANGE UP (ref 13–75)
LYMPHOCYTES # BLD AUTO: 1.53 K/UL — SIGNIFICANT CHANGE UP (ref 1–3.3)
LYMPHOCYTES # BLD AUTO: 13 % — SIGNIFICANT CHANGE UP (ref 13–44)
MCHC RBC-ENTMCNC: 30.2 PG — SIGNIFICANT CHANGE UP (ref 27–34)
MCHC RBC-ENTMCNC: 32.4 GM/DL — SIGNIFICANT CHANGE UP (ref 32–36)
MCV RBC AUTO: 93.3 FL — SIGNIFICANT CHANGE UP (ref 80–100)
MONOCYTES # BLD AUTO: 0.21 K/UL — SIGNIFICANT CHANGE UP (ref 0–0.9)
MONOCYTES NFR BLD AUTO: 1.8 % — LOW (ref 2–14)
NEUTROPHILS # BLD AUTO: 9.94 K/UL — HIGH (ref 1.8–7.4)
NEUTROPHILS NFR BLD AUTO: 84.5 % — HIGH (ref 43–77)
NITRITE UR-MCNC: NEGATIVE — SIGNIFICANT CHANGE UP
PH UR: 8 — SIGNIFICANT CHANGE UP (ref 5–8)
PLATELET # BLD AUTO: 295 K/UL — SIGNIFICANT CHANGE UP (ref 150–400)
POTASSIUM SERPL-MCNC: 3.8 MMOL/L — SIGNIFICANT CHANGE UP (ref 3.5–5.3)
POTASSIUM SERPL-SCNC: 3.8 MMOL/L — SIGNIFICANT CHANGE UP (ref 3.5–5.3)
PROT SERPL-MCNC: 8.1 GM/DL — SIGNIFICANT CHANGE UP (ref 6–8.3)
PROT UR-MCNC: NEGATIVE MG/DL — SIGNIFICANT CHANGE UP
RBC # BLD: 4.47 M/UL — SIGNIFICANT CHANGE UP (ref 3.8–5.2)
RBC # FLD: 12.8 % — SIGNIFICANT CHANGE UP (ref 10.3–14.5)
RBC CASTS # UR COMP ASSIST: 2 /HPF — SIGNIFICANT CHANGE UP (ref 0–4)
SODIUM SERPL-SCNC: 140 MMOL/L — SIGNIFICANT CHANGE UP (ref 135–145)
SP GR SPEC: 1.01 — SIGNIFICANT CHANGE UP (ref 1–1.03)
SQUAMOUS # UR AUTO: 0 /HPF — SIGNIFICANT CHANGE UP (ref 0–5)
TROPONIN I, HIGH SENSITIVITY RESULT: 42.07 NG/L — SIGNIFICANT CHANGE UP
UROBILINOGEN FLD QL: 0.2 MG/DL — SIGNIFICANT CHANGE UP (ref 0.2–1)
WBC # BLD: 11.76 K/UL — HIGH (ref 3.8–10.5)
WBC # FLD AUTO: 11.76 K/UL — HIGH (ref 3.8–10.5)
WBC UR QL: 1 /HPF — SIGNIFICANT CHANGE UP (ref 0–5)

## 2024-05-02 PROCEDURE — 71045 X-RAY EXAM CHEST 1 VIEW: CPT

## 2024-05-02 PROCEDURE — 71045 X-RAY EXAM CHEST 1 VIEW: CPT | Mod: 26

## 2024-05-02 PROCEDURE — 96375 TX/PRO/DX INJ NEW DRUG ADDON: CPT

## 2024-05-02 PROCEDURE — 93005 ELECTROCARDIOGRAM TRACING: CPT

## 2024-05-02 PROCEDURE — 81001 URINALYSIS AUTO W/SCOPE: CPT

## 2024-05-02 PROCEDURE — 74177 CT ABD & PELVIS W/CONTRAST: CPT | Mod: 26,MC

## 2024-05-02 PROCEDURE — 85025 COMPLETE CBC W/AUTO DIFF WBC: CPT

## 2024-05-02 PROCEDURE — 74177 CT ABD & PELVIS W/CONTRAST: CPT | Mod: MC

## 2024-05-02 PROCEDURE — 93010 ELECTROCARDIOGRAM REPORT: CPT

## 2024-05-02 PROCEDURE — 36415 COLL VENOUS BLD VENIPUNCTURE: CPT

## 2024-05-02 PROCEDURE — 96374 THER/PROPH/DIAG INJ IV PUSH: CPT | Mod: XU

## 2024-05-02 PROCEDURE — 83690 ASSAY OF LIPASE: CPT

## 2024-05-02 PROCEDURE — 84484 ASSAY OF TROPONIN QUANT: CPT

## 2024-05-02 PROCEDURE — 99285 EMERGENCY DEPT VISIT HI MDM: CPT | Mod: 25

## 2024-05-02 PROCEDURE — 80053 COMPREHEN METABOLIC PANEL: CPT

## 2024-05-02 PROCEDURE — 99285 EMERGENCY DEPT VISIT HI MDM: CPT

## 2024-05-02 RX ORDER — DIPHENHYDRAMINE HCL 50 MG
25 CAPSULE ORAL ONCE
Refills: 0 | Status: COMPLETED | OUTPATIENT
Start: 2024-05-02 | End: 2024-05-02

## 2024-05-02 RX ORDER — METOCLOPRAMIDE HCL 10 MG
10 TABLET ORAL ONCE
Refills: 0 | Status: COMPLETED | OUTPATIENT
Start: 2024-05-02 | End: 2024-05-02

## 2024-05-02 RX ORDER — FAMOTIDINE 10 MG/ML
20 INJECTION INTRAVENOUS ONCE
Refills: 0 | Status: COMPLETED | OUTPATIENT
Start: 2024-05-02 | End: 2024-05-02

## 2024-05-02 RX ORDER — MECLIZINE HCL 12.5 MG
25 TABLET ORAL ONCE
Refills: 0 | Status: COMPLETED | OUTPATIENT
Start: 2024-05-02 | End: 2024-05-02

## 2024-05-02 RX ORDER — ONDANSETRON 8 MG/1
1 TABLET, FILM COATED ORAL
Qty: 15 | Refills: 0
Start: 2024-05-02 | End: 2024-05-06

## 2024-05-02 RX ORDER — SODIUM CHLORIDE 9 MG/ML
1000 INJECTION INTRAMUSCULAR; INTRAVENOUS; SUBCUTANEOUS ONCE
Refills: 0 | Status: COMPLETED | OUTPATIENT
Start: 2024-05-02 | End: 2024-05-02

## 2024-05-02 RX ADMIN — SODIUM CHLORIDE 1000 MILLILITER(S): 9 INJECTION INTRAMUSCULAR; INTRAVENOUS; SUBCUTANEOUS at 18:24

## 2024-05-02 RX ADMIN — Medication 25 MILLIGRAM(S): at 18:28

## 2024-05-02 RX ADMIN — Medication 10 MILLIGRAM(S): at 18:28

## 2024-05-02 RX ADMIN — FAMOTIDINE 20 MILLIGRAM(S): 10 INJECTION INTRAVENOUS at 18:27

## 2024-05-02 NOTE — ED ADULT NURSE NOTE - OBJECTIVE STATEMENT
Pt presented to the ED with complaint of nausea and vomiting. Pt ate lunch and then started vomiting around 13:00. Pt denies any other symptoms. Pt took own Zofran and Meclizine without relief.

## 2024-05-02 NOTE — ED STATDOCS - NSICDXPASTMEDICALHX_GEN_ALL_CORE_FT
PAST MEDICAL HISTORY:  Anxiety     Early cataract     Frequent UTI     Gallstones     GERD (gastroesophageal reflux disease)     Hyperlipidemia     LBBB (left bundle branch block)     Seasonal allergies     Vertigo     Vestibular migraine

## 2024-05-02 NOTE — ED ADULT TRIAGE NOTE - CHIEF COMPLAINT QUOTE
pt BIBA complaining of nausea, vomiting and dizziness since shopping at 1pm today.  pt took her own zofran but vomited shortly after.  pt has a 20 G L AC and was given 4mg Zofran by EMS.  taken for EKG

## 2024-05-02 NOTE — ED STATDOCS - PATIENT PORTAL LINK FT
You can access the FollowMyHealth Patient Portal offered by [facility] by registering at the following website: [email or web address]. By joining [employer]’s FollowMyHealth portal, you will also be able to view your health information using other applications (apps) compatible with our system.

## 2024-05-02 NOTE — ED STATDOCS - PHYSICAL EXAMINATION
Constitutional: well appearing, NAD AAOx3  Eyes: EOMI, PERRL  Head: Normocephalic atraumatic  Mouth: no airway obstruction, posterior oropharynx clear without erythema or exudate  Neck: supple  Cardiac: regular rate and rhythm, no MRG  Resp: Lungs CTAB  GI: Abd s/nt/nd  Neuro: CN2-12 intact, strength 5/5x4, sensation grossly intact  Skin: No rashes Constitutional: well appearing, NAD AAOx3  Eyes: EOMI, PERRL, horizontal nystagmus leftward gaze   Head: Normocephalic atraumatic  Mouth: no airway obstruction, posterior oropharynx clear without erythema or exudate  Neck: supple  Cardiac: regular rate and rhythm, no MRG  Resp: Lungs CTAB  GI: Abd s/nt/nd  Neuro: CN2-12 intact, strength 5/5x4, sensation grossly intact  Skin: No rashes

## 2024-05-02 NOTE — ED STATDOCS - OBJECTIVE STATEMENT
74 y/o female with PMHx of vestibular migraines, pacemaker, defibrillator, HLD, GERD, on 81mg asa presents to the ED c/o lightheadedness, vomiting. States at 1PM ate pizza, soon after began feeling lightheaded, started vomiting. Denies abdominal pin, cp, hematuria, headache, fever. States she was with her friend who ate the same pizza and has no symptoms. Able to ambulate but feels dizzy when walking. 72 y/o female with PMHx of vestibular migraines, pacemaker, defibrillator, HLD, GERD, on 81mg asa presents to the ED c/o lightheadedness, vomiting. States at 1PM ate pizza, soon after began feeling lightheaded, started vomiting. States she was with her friend who ate the same pizza and has no symptoms. Able to ambulate but feels dizzy when walking. States her symptoms don't feel similar to migraines. Denies abdominal pin, cp, hematuria, headache, fever. 74 y/o female with PMHx of vestibular migraines, pacemaker, defibrillator, HLD, GERD, on 81mg asa presents to the ED c/o lightheadedness, vomiting. States at 1PM ate pizza, soon after began feeling lightheaded, started vomiting. Able to ambulate but feels lightheaded when walking. States her symptoms don't feel similar to vestibular migraines. Denies abdominal pin, cp, hematuria, headache, fever, urinary symptopms, diarrhea, black or bloody stools.

## 2024-05-02 NOTE — ED STATDOCS - PROGRESS NOTE DETAILS
72 yo female with a PMH of vestibular migraines, PPM/defibrillator placement and on ASA 81mg, HLD, GERD, presents with feeling lightheaded/dizzy and multiple episodes of vomiting at approx 1pm s/p eating 2 slices of pizza at noon today. Pt tried to take antinausea medication and her dizziness medication prior to arrival but continued to vomit. EMS was called and they placed an IV and gave her medication without relief.   Neuro exam unremarkable. Gait not tested yet. Will check labs, meds, IVF, CTAP and reeval. -Rocky Vieira PA-C Pt feeling better. Dizziness and nausea have resolved. Pending CT and CXR. -Rocky Vieira PA-C CXR and CT unremarkable. Pt still feels better from when she first arrived. Will give Po challenge (pt currently tolerating crackers) and d/c home. -oRcky Vieira PA-C

## 2024-07-09 ENCOUNTER — OFFICE (OUTPATIENT)
Dept: URBAN - METROPOLITAN AREA CLINIC 6 | Facility: CLINIC | Age: 74
Setting detail: OPHTHALMOLOGY
End: 2024-07-09
Payer: COMMERCIAL

## 2024-07-09 DIAGNOSIS — H25.13: ICD-10-CM

## 2024-07-09 DIAGNOSIS — H01.004: ICD-10-CM

## 2024-07-09 DIAGNOSIS — H01.005: ICD-10-CM

## 2024-07-09 DIAGNOSIS — H43.813: ICD-10-CM

## 2024-07-09 DIAGNOSIS — H16.223: ICD-10-CM

## 2024-07-09 DIAGNOSIS — H01.002: ICD-10-CM

## 2024-07-09 DIAGNOSIS — H01.001: ICD-10-CM

## 2024-07-09 PROCEDURE — 92014 COMPRE OPH EXAM EST PT 1/>: CPT | Performed by: OPHTHALMOLOGY

## 2024-07-09 ASSESSMENT — CONFRONTATIONAL VISUAL FIELD TEST (CVF)
OD_FINDINGS: FULL
OS_FINDINGS: FULL

## 2024-07-09 ASSESSMENT — LID EXAM ASSESSMENTS
OD_BLEPHARITIS: RLL RUL
OS_BLEPHARITIS: LLL LUL

## 2024-09-09 ENCOUNTER — APPOINTMENT (OUTPATIENT)
Dept: ORTHOPEDIC SURGERY | Facility: CLINIC | Age: 74
End: 2024-09-09